# Patient Record
Sex: FEMALE | Race: WHITE | NOT HISPANIC OR LATINO | Employment: FULL TIME | URBAN - METROPOLITAN AREA
[De-identification: names, ages, dates, MRNs, and addresses within clinical notes are randomized per-mention and may not be internally consistent; named-entity substitution may affect disease eponyms.]

---

## 2023-04-13 ENCOUNTER — OFFICE VISIT (OUTPATIENT)
Dept: OBSTETRICS AND GYNECOLOGY | Facility: CLINIC | Age: 23
End: 2023-04-13
Payer: COMMERCIAL

## 2023-04-13 VITALS
DIASTOLIC BLOOD PRESSURE: 82 MMHG | HEIGHT: 71 IN | WEIGHT: 268 LBS | BODY MASS INDEX: 37.52 KG/M2 | SYSTOLIC BLOOD PRESSURE: 134 MMHG

## 2023-04-13 DIAGNOSIS — Z32.00 ENCOUNTER FOR CONFIRMATION OF PREGNANCY TEST RESULT WITH PHYSICAL EXAMINATION: Primary | ICD-10-CM

## 2023-04-13 LAB
B-HCG UR QL: POSITIVE
EXPIRATION DATE: ABNORMAL
INTERNAL NEGATIVE CONTROL: ABNORMAL
INTERNAL POSITIVE CONTROL: ABNORMAL
Lab: ABNORMAL

## 2023-04-13 RX ORDER — DIPHENHYDRAMINE HYDROCHLORIDE 25 MG/1
25 CAPSULE ORAL DAILY
Qty: 30 TABLET | Refills: 1 | Status: SHIPPED | OUTPATIENT
Start: 2023-04-13

## 2023-04-13 RX ORDER — FEXOFENADINE HCL 180 MG/1
180 TABLET ORAL DAILY
COMMUNITY

## 2023-04-13 NOTE — PROGRESS NOTES
"PROBLEM VISIT    Chief Complaint: confirmation of pregnancy      Giuliana Pan is a 22 y.o. patient  presents for confirmation of pregnancy.  Patient has a definite LMP.  She states she was trying for pregnancy.  She is on prenatal vitamins.  Patient is reporting nausea and vomiting.  She is interested in starting medications.  Chief Complaint   Patient presents with   • Amenorrhea             The following portions of the patient's history were reviewed and updated as appropriate: allergies, current medications and problem list.    Review of Systems   Constitutional: Negative for appetite change, chills, fatigue, fever and unexpected weight change.   Gastrointestinal: Positive for nausea and vomiting. Negative for abdominal distention, abdominal pain, anal bleeding, blood in stool, constipation and diarrhea.   Genitourinary: Negative for dyspareunia, dysuria, menstrual problem, pelvic pain, vaginal bleeding, vaginal discharge and vaginal pain.       /82   Ht 180.3 cm (71\")   Wt 122 kg (268 lb)   LMP 2023   BMI 37.38 kg/m²     Physical Exam  Constitutional:       General: She is not in acute distress.     Appearance: Normal appearance. She is not ill-appearing, toxic-appearing or diaphoretic.   Abdominal:      General: There is no distension.      Palpations: Abdomen is soft.      Tenderness: There is no abdominal tenderness. There is no guarding.   Neurological:      General: No focal deficit present.      Mental Status: She is alert and oriented to person, place, and time. Mental status is at baseline.      Motor: No weakness.      Gait: Gait normal.   Psychiatric:         Mood and Affect: Mood normal.         Behavior: Behavior normal.         Thought Content: Thought content normal.         Judgment: Judgment normal.           Assessment & Plan   Diagnoses and all orders for this visit:    1. Encounter for confirmation of pregnancy test result with physical examination (Primary)  -     " Cancel: POC Urinalysis Dipstick  -     POC Pregnancy, Urine    Other orders  -     doxylamine (UNISOM) 25 MG tablet; Take 1 tablet by mouth Every Night.  Dispense: 30 tablet; Refill: 1  -     vitamin B-6 (PYRIDOXINE) 25 MG tablet; Take 1 tablet by mouth Daily.  Dispense: 30 tablet; Refill: 1      22-year-old G1, P0 for confirmation of pregnancy    1) pregnancy: Patient reports a definite LMP and first trimester ultrasound today confirms that she is 9 weeks with NORBERTO 11/14/23. On PNV.  Discussed cell free DNA and genetic carrier screening.  Patient will follow-up in 2 weeks for new OB visit with Pap smear.  US findings: IUP at 8.2 weeks by US. + FCA. Left ovarian cyst measuring 1.95 x 1.77cm    2) N/V of pregnancy: Rx Unisom and vitamins.  Patient to question as well    3) Gyn HM: Due for pap smear             Return in about 2 weeks (around 4/27/2023).      Kimberli Mckinney DO    4/15/2023  15:55 EDT

## 2023-04-27 ENCOUNTER — INITIAL PRENATAL (OUTPATIENT)
Dept: OBSTETRICS AND GYNECOLOGY | Facility: CLINIC | Age: 23
End: 2023-04-27
Payer: COMMERCIAL

## 2023-04-27 VITALS — DIASTOLIC BLOOD PRESSURE: 70 MMHG | BODY MASS INDEX: 37.1 KG/M2 | SYSTOLIC BLOOD PRESSURE: 118 MMHG | WEIGHT: 266 LBS

## 2023-04-27 DIAGNOSIS — Z34.00 INITIAL OBSTETRIC VISIT, ANTEPARTUM: Primary | ICD-10-CM

## 2023-04-27 DIAGNOSIS — Z36.9 ENCOUNTER FOR ANTENATAL SCREENING, UNSPECIFIED: ICD-10-CM

## 2023-04-27 DIAGNOSIS — Z36.9 UNSPECIFIED ANTENATAL SCREENING: ICD-10-CM

## 2023-04-27 DIAGNOSIS — Z01.419 PAP SMEAR, LOW-RISK: ICD-10-CM

## 2023-04-27 DIAGNOSIS — O21.9 NAUSEA AND VOMITING IN PREGNANCY: ICD-10-CM

## 2023-04-27 LAB
GLUCOSE UR STRIP-MCNC: NEGATIVE MG/DL
PROT UR STRIP-MCNC: NEGATIVE MG/DL

## 2023-04-27 PROCEDURE — 0501F PRENATAL FLOW SHEET: CPT | Performed by: NURSE PRACTITIONER

## 2023-04-27 RX ORDER — PRENATAL VIT/IRON FUM/FOLIC AC 27MG-0.8MG
TABLET ORAL DAILY
COMMUNITY

## 2023-04-27 RX ORDER — ONDANSETRON 4 MG/1
4 TABLET, ORALLY DISINTEGRATING ORAL EVERY 8 HOURS PRN
Qty: 20 TABLET | Refills: 1 | Status: SHIPPED | OUTPATIENT
Start: 2023-04-27

## 2023-04-27 NOTE — PROGRESS NOTES
Initial OB Visit     Chief Complaint   Patient presents with   • Initial Prenatal Visit       Giuliana Pan is being seen today for her first obstetrical visit.  She is a 22 y.o.  11w0d gestation. This problem is new to me: yes. Here with significant other.    # 1 - Date: None, Sex: None, Weight: None, GA: None, Delivery: None, Apgar1: None, Apgar5: None, Living: None, Birth Comments: None      LNMP: 23  Confident with date: Yes  Taking prenatal vitamins: Yes  Planned pregnancy: Yes  Prior obstetric issues, potential pregnancy concerns: G1  Family history of genetic issues (includes FOB): no  Prior infections concerning in pregnancy (Rash, fever in last 2 weeks): no  Varicella Hx: unknown  Flu vaccine: no  COVID Vaccine: no   History of STDs: no  Current medications: PNV, unisom/Vit B 6  Last pap smear: 2 yrs ago  Smoker: former  Drug or alcohol abuse: No  H/O Physical, mental or sexual abuse:no  H/O mental health disorder: no  Prior testing for Cystic Fibrosis Carrier or Sickle Cell Trait- no  Prepregnancy BMI: Body mass index is 37.1 kg/m².      History reviewed. No pertinent past medical history.    History reviewed. No pertinent surgical history.      Current Outpatient Medications:   •  Prenatal Vit-Fe Fumarate-FA (prenatal vitamin 27-0.8) 27-0.8 MG tablet tablet, Take  by mouth Daily., Disp: , Rfl:   •  doxylamine (UNISOM) 25 MG tablet, Take 1 tablet by mouth Every Night., Disp: 30 tablet, Rfl: 1  •  fexofenadine (ALLEGRA) 180 MG tablet, Take 1 tablet by mouth Daily. [employer] brand, Disp: , Rfl:   •  ondansetron ODT (ZOFRAN-ODT) 4 MG disintegrating tablet, Place 1 tablet on the tongue Every 8 (Eight) Hours As Needed for Nausea or Vomiting., Disp: 20 tablet, Rfl: 1  •  vitamin B-6 (PYRIDOXINE) 25 MG tablet, Take 1 tablet by mouth Daily., Disp: 30 tablet, Rfl: 1    Allergies   Allergen Reactions   • Sulfa Antibiotics Other (See Comments)       Social History     Socioeconomic History   • Marital status:  Single   Tobacco Use   • Smoking status: Former   • Smokeless tobacco: Former   Vaping Use   • Vaping Use: Former   Substance and Sexual Activity   • Alcohol use: Not Currently   • Drug use: Never   • Sexual activity: Yes     Partners: Male       Family History   Problem Relation Age of Onset   • Diabetes Father    • Diabetes Paternal Grandmother    • Diabetes Maternal Grandmother    • Heart attack Maternal Grandfather    • Diabetes Paternal Uncle    • Breast cancer Neg Hx    • Uterine cancer Neg Hx    • Ovarian cancer Neg Hx    • Colon cancer Neg Hx        Review of systems     All other systems reviewed and are negative except for: Gastrointestinal: positive for nausea and vomiting     Objective    /70   Wt 121 kg (266 lb)   LMP 02/09/2023   BMI 37.10 kg/m²     General Appearance:    Alert, cooperative, in no acute distress, habitus obese   Head:    Normocephalic, without obvious abnormality, atraumatic   Neck:   No adenopathy, supple, trachea midline, no thyromegaly   Lungs:     Clear to auscultation,respirations regular, even and     unlabored    Heart:    Regular rhythm and normal rate, normal S1 and S2, no       murmur, no gallop, no rub, no click   Breast Exam:    No masses, No nipple discharge   Abdomen:     Normal bowel sounds, no masses, no organomegaly, soft,    non-tender, non-distended, no guarding, no rebound                tenderness   Genitalia:    Vulva - BUS-WNL, NEFG    Vagina - No discharge, No bleeding    Cervix - No Lesions, closed     Uterus - Consistent with 11 weeks    Adnexa - No mass, NT    Pelvimetry - clinically adequate, gynecoid pelvis     Extremities:   Moves all extremities well, no edema, no cyanosis, no        redness   Skin:   No bleeding, bruising or rash   Lymph nodes:   No palpable adenopathy   Neurologic:   Sensation intact, A&O times 3         Assessment/Plan    1) Pregnancy at 11w0d- +FCA noted on BS US. EDC established 11/16/2023 and confirmed by LNMP..     2) OB  exam: OB exam completed: Yes. New OB bag provided Yes. Pap collected: Yes. Provided list of safe medications to take while in pregnancy.    3) Labs: OB labs collected: Yes. Counseled on genetic screening/NIPS: Yes, she desires both. Counseled on Quad screen and AFP: Yes, she desires both.    4) Body mass index is 37.1 kg/m². Obese women with a pre-pregnancy BMI of 30+ should strive to gain approx 11-20 pounds for the entire pregnancy.      5)  Prenatal care: Oriented to the office and prenatal care. Encourage prenatal vitamins. Disc Tylenol products are fine, avoid aspirin and ibuprofen; Zika (travel restrictions/ok to use insect repellant); not to change cat litter; food restrictions; exercise; avoidance of alcohol, tobacco, drugs and saunas/hot tubs.     6) nausea/vomiting - ERX Zofran      All questions answered.     I spent 30 minutes caring for Giuliana on this date of service. This time includes time spent by me in the following activities: preparing for the visit, reviewing tests, obtaining and/or reviewing a separately obtained history, performing a medically appropriate examination and/or evaluation, counseling and educating the patient/family/caregiver, ordering medications, tests, or procedures and documenting information in the medical record.      RTO 4 weeks for OBT and AFP    Kathleen Steel, APRCYNTHIA    4/27/2023  13:44 EDT

## 2023-04-28 PROBLEM — Z28.39 MATERNAL VARICELLA, NON-IMMUNE: Status: ACTIVE | Noted: 2023-04-28

## 2023-04-28 PROBLEM — O09.899 MATERNAL VARICELLA, NON-IMMUNE: Status: ACTIVE | Noted: 2023-04-28

## 2023-04-28 LAB
ABO GROUP BLD: ABNORMAL
BASOPHILS # BLD AUTO: 0.1 X10E3/UL (ref 0–0.2)
BASOPHILS NFR BLD AUTO: 1 %
BLD GP AB SCN SERPL QL: NEGATIVE
EOSINOPHIL # BLD AUTO: 0.3 X10E3/UL (ref 0–0.4)
EOSINOPHIL NFR BLD AUTO: 4 %
ERYTHROCYTE [DISTWIDTH] IN BLOOD BY AUTOMATED COUNT: 14 % (ref 11.7–15.4)
HBA1C MFR BLD: 5.5 % (ref 4.8–5.6)
HBV SURFACE AG SERPL QL IA: NEGATIVE
HCT VFR BLD AUTO: 39.2 % (ref 34–46.6)
HCV IGG SERPL QL IA: NON REACTIVE
HGB BLD-MCNC: 12.5 G/DL (ref 11.1–15.9)
HIV 1+2 AB+HIV1 P24 AG SERPL QL IA: NON REACTIVE
IMM GRANULOCYTES # BLD AUTO: 0 X10E3/UL (ref 0–0.1)
IMM GRANULOCYTES NFR BLD AUTO: 0 %
LYMPHOCYTES # BLD AUTO: 1.7 X10E3/UL (ref 0.7–3.1)
LYMPHOCYTES NFR BLD AUTO: 18 %
MCH RBC QN AUTO: 25.8 PG (ref 26.6–33)
MCHC RBC AUTO-ENTMCNC: 31.9 G/DL (ref 31.5–35.7)
MCV RBC AUTO: 81 FL (ref 79–97)
MONOCYTES # BLD AUTO: 0.7 X10E3/UL (ref 0.1–0.9)
MONOCYTES NFR BLD AUTO: 7 %
NEUTROPHILS # BLD AUTO: 7 X10E3/UL (ref 1.4–7)
NEUTROPHILS NFR BLD AUTO: 70 %
PLATELET # BLD AUTO: 358 X10E3/UL (ref 150–450)
RBC # BLD AUTO: 4.84 X10E6/UL (ref 3.77–5.28)
RH BLD: POSITIVE
RPR SER QL: NON REACTIVE
RUBV IGG SERPL IA-ACNC: 2.51 INDEX
VZV IGG SER IA-ACNC: <135 INDEX
WBC # BLD AUTO: 9.8 X10E3/UL (ref 3.4–10.8)

## 2023-04-29 LAB
BACTERIA UR CULT: NORMAL
BACTERIA UR CULT: NORMAL

## 2023-05-02 LAB
CFDNA.FET/CFDNA.TOTAL SFR FETUS: NORMAL %
CITATION REF LAB TEST: NORMAL
FET 13+18+21+X+Y ANEUP PLAS.CFDNA: NEGATIVE
FET CHR 21 TS PLAS.CFDNA QL: NEGATIVE
FET SEX PLAS.CFDNA DOSAGE CFDNA: NORMAL
FET TS 13 RISK PLAS.CFDNA QL: NEGATIVE
FET TS 18 RISK WBC.DNA+CFDNA QL: NEGATIVE
GA EST FROM CONCEPTION DATE: NORMAL D
GESTATIONAL AGE > 9:: YES
LAB DIRECTOR NAME PROVIDER: NORMAL
LAB DIRECTOR NAME PROVIDER: NORMAL
LABORATORY COMMENT REPORT: NORMAL
LIMITATIONS OF THE TEST: NORMAL
NEGATIVE PREDICTIVE VALUE: NORMAL
NOTE: NORMAL
PERFORMANCE CHARACTERISTICS: NORMAL
POSITIVE PREDICTIVE VALUE: NORMAL
REF LAB TEST METHOD: NORMAL
TEST PERFORMANCE INFO SPEC: NORMAL

## 2023-05-05 LAB
C TRACH RRNA CVX QL NAA+PROBE: NEGATIVE
CONV .: NORMAL
CYTOLOGIST CVX/VAG CYTO: NORMAL
CYTOLOGY CVX/VAG DOC CYTO: NORMAL
CYTOLOGY CVX/VAG DOC THIN PREP: NORMAL
DX ICD CODE: NORMAL
HIV 1 & 2 AB SER-IMP: NORMAL
N GONORRHOEA RRNA CVX QL NAA+PROBE: NEGATIVE
OTHER STN SPEC: NORMAL
STAT OF ADQ CVX/VAG CYTO-IMP: NORMAL
T VAGINALIS RRNA SPEC QL NAA+PROBE: NEGATIVE

## 2023-05-25 ENCOUNTER — ROUTINE PRENATAL (OUTPATIENT)
Dept: OBSTETRICS AND GYNECOLOGY | Facility: CLINIC | Age: 23
End: 2023-05-25
Payer: COMMERCIAL

## 2023-05-25 VITALS — WEIGHT: 255.6 LBS | DIASTOLIC BLOOD PRESSURE: 86 MMHG | BODY MASS INDEX: 35.65 KG/M2 | SYSTOLIC BLOOD PRESSURE: 122 MMHG

## 2023-05-25 DIAGNOSIS — Z34.92 PRENATAL CARE IN SECOND TRIMESTER: Primary | ICD-10-CM

## 2023-05-25 DIAGNOSIS — Z36.9 ENCOUNTER FOR ANTENATAL SCREENING, UNSPECIFIED: ICD-10-CM

## 2023-05-25 LAB
GLUCOSE UR STRIP-MCNC: NEGATIVE MG/DL
PROT UR STRIP-MCNC: NEGATIVE MG/DL

## 2023-05-25 PROCEDURE — 0502F SUBSEQUENT PRENATAL CARE: CPT | Performed by: NURSE PRACTITIONER

## 2023-05-25 NOTE — PROGRESS NOTES
OB follow up     CC:  Here for prenatal follow up    Giuliana Pan is a 22 y.o.  15w0d being seen today for her obstetrical visit.  Patient reports  improved but continued nausea.   Taking +PNV.     Review of Systems  GI: + nausea   Genitourinary: Neg for cramping, vaginal bleeding, SROM, or dysuria.       /86   Wt 116 kg (255 lb 9.6 oz)   LMP 2023   BMI 35.65 kg/m²     FHT: 150 BPM   Uterine Size: size equals dates   Assessment    1) Pregnancy at 15w0d- T21 neg, male. Declines CF, SMA, FX and AFP today.     2) Varicella non immune- Offer varivax postpartum     3) N/V- Has lost 11# since her last visit. She is eating and not skipping meals. She reports she has vomited 2x in the last week and a half.  Has B6, unisom and zofran at home. Reports improved but continues to occur.     4) Increased BMI- BMI 35. Will need growth US 28, 32,@ 36 weeks.     5) Declines flu and covid vaccine       Plan    Continue prenatal vitamins   Reviewed this stage of pregnancy  Problem list updated   Follow up in 4 weeks for OB tummy and anatomy US     Yuni You, APRN  2023  11:46 EDT

## 2023-06-05 PROBLEM — Z34.92 PRENATAL CARE IN SECOND TRIMESTER: Status: ACTIVE | Noted: 2023-06-05

## 2023-08-03 ENCOUNTER — ROUTINE PRENATAL (OUTPATIENT)
Dept: OBSTETRICS AND GYNECOLOGY | Facility: CLINIC | Age: 23
End: 2023-08-03
Payer: COMMERCIAL

## 2023-08-03 VITALS — BODY MASS INDEX: 34.67 KG/M2 | DIASTOLIC BLOOD PRESSURE: 88 MMHG | SYSTOLIC BLOOD PRESSURE: 136 MMHG | WEIGHT: 248.6 LBS

## 2023-08-03 DIAGNOSIS — Z36.9 ENCOUNTER FOR ANTENATAL SCREENING, UNSPECIFIED: Primary | ICD-10-CM

## 2023-08-03 LAB
BILIRUB BLD-MCNC: NEGATIVE MG/DL
CLARITY, POC: CLEAR
COLOR UR: YELLOW
GLUCOSE UR STRIP-MCNC: NEGATIVE MG/DL
KETONES UR QL: NEGATIVE
LEUKOCYTE EST, POC: NEGATIVE
NITRITE UR-MCNC: NEGATIVE MG/ML
PH UR: 5 [PH] (ref 5–8)
PROT UR STRIP-MCNC: NEGATIVE MG/DL
RBC # UR STRIP: NEGATIVE /UL
SP GR UR: 1 (ref 1–1.03)
UROBILINOGEN UR QL: NORMAL

## 2023-08-24 ENCOUNTER — ROUTINE PRENATAL (OUTPATIENT)
Dept: OBSTETRICS AND GYNECOLOGY | Facility: CLINIC | Age: 23
End: 2023-08-24
Payer: COMMERCIAL

## 2023-08-24 VITALS — BODY MASS INDEX: 30.54 KG/M2 | DIASTOLIC BLOOD PRESSURE: 88 MMHG | WEIGHT: 219 LBS | SYSTOLIC BLOOD PRESSURE: 128 MMHG

## 2023-08-24 DIAGNOSIS — E66.9 OBESITY (BMI 30-39.9): ICD-10-CM

## 2023-08-24 DIAGNOSIS — Z28.39 MATERNAL VARICELLA, NON-IMMUNE: ICD-10-CM

## 2023-08-24 DIAGNOSIS — Z36.9 UNSPECIFIED ANTENATAL SCREENING: ICD-10-CM

## 2023-08-24 DIAGNOSIS — O09.899 MATERNAL VARICELLA, NON-IMMUNE: ICD-10-CM

## 2023-08-24 DIAGNOSIS — Z23 NEED FOR TDAP VACCINATION: ICD-10-CM

## 2023-08-24 DIAGNOSIS — Z3A.28 28 WEEKS GESTATION OF PREGNANCY: Primary | ICD-10-CM

## 2023-08-24 PROBLEM — Z34.93 PRENATAL CARE IN THIRD TRIMESTER: Status: ACTIVE | Noted: 2023-06-05

## 2023-08-24 LAB
2ND TRIMESTER 4 SCREEN SERPL-IMP: NORMAL
BILIRUB BLD-MCNC: NEGATIVE MG/DL
CLARITY, POC: CLEAR
COLOR UR: YELLOW
EXTERNAL CYSTIC FIBROSIS: NORMAL
EXTERNAL NIPT: NORMAL
GLUCOSE UR STRIP-MCNC: NEGATIVE MG/DL
KETONES UR QL: NEGATIVE
LEUKOCYTE EST, POC: NEGATIVE
NITRITE UR-MCNC: NEGATIVE MG/ML
PH UR: 5 [PH] (ref 5–8)
PROT UR STRIP-MCNC: NEGATIVE MG/DL
RBC # UR STRIP: NEGATIVE /UL
SP GR UR: 1 (ref 1–1.03)
UROBILINOGEN UR QL: NORMAL

## 2023-08-24 NOTE — PROGRESS NOTES
OB follow up     Giuliana Pan is a 22 y.o.  28w0d being seen today for her obstetrical visit.  Patient reports no complaints. Fetal movement: normal. She is agreeable to Tdap vaccine and we discussed getting a flu shot when available. This is the first time I am seeing this patient in this pregnancy and all of her problems are new to me, the examiner.       Review of Systems  No bleeding, No cramping/contractions     /88   Wt 99.3 kg (219 lb)   LMP 2023   BMI 30.54 kg/mý     FHT: 127 BPM   Uterine Size: 30  cm       Assessment/Plan:    1) 22 y.o.  -pregnancy at 28w0d- 2 hour GTT in progress. RH +    2) Patient advised to have the Tdap shot in the later part of pregnancy to help protect against whooping cough.  Also advised that FOB and other adults who come in contact with the infant should also be vaccinated with Tdap.  Vaccine given today    3) BMI 30- HgBA1c= 5.5. US growth today shows growth 59% ( 2.12#), JAMIE= 20.1 cm.. FHR= 127, transverse fetus, posterior placetna. US compared to last scan on 2023. Repeat US growth 32 and 36 weeks    4) Enc flu shot when available    5) Check UDS ( not done previously)    6) VNI- avoid people with fevers and rashes. Varivax pp    7) Declines CF/SMA/FX/ECS    8) NIPT low risk, missed AFP    9)Reviewed this stage of pregnancy    10)Problem list updated     11) RTO 2 weeks OBT      Brenda Hopper MD    2023  12:27 EDT

## 2023-08-25 LAB
GLUCOSE 1H P 75 G GLC PO SERPL-MCNC: 142 MG/DL (ref 65–179)
GLUCOSE 2H P 75 G GLC PO SERPL-MCNC: 111 MG/DL (ref 65–154)
GLUCOSE P FAST SERPL-MCNC: 32 MG/DL (ref 65–94)
HCT VFR BLD AUTO: 37 % (ref 34–46.6)
HGB BLD-MCNC: 12.2 G/DL (ref 12–15.9)

## 2023-09-08 ENCOUNTER — ROUTINE PRENATAL (OUTPATIENT)
Dept: OBSTETRICS AND GYNECOLOGY | Facility: CLINIC | Age: 23
End: 2023-09-08
Payer: COMMERCIAL

## 2023-09-08 VITALS — BODY MASS INDEX: 34.7 KG/M2 | WEIGHT: 248.8 LBS | DIASTOLIC BLOOD PRESSURE: 86 MMHG | SYSTOLIC BLOOD PRESSURE: 126 MMHG

## 2023-09-08 DIAGNOSIS — E66.9 OBESITY (BMI 30-39.9): ICD-10-CM

## 2023-09-08 DIAGNOSIS — Z34.93 PRENATAL CARE IN THIRD TRIMESTER: Primary | ICD-10-CM

## 2023-09-08 DIAGNOSIS — Z28.39 MATERNAL VARICELLA, NON-IMMUNE: ICD-10-CM

## 2023-09-08 DIAGNOSIS — O09.899 MATERNAL VARICELLA, NON-IMMUNE: ICD-10-CM

## 2023-09-08 NOTE — PROGRESS NOTES
OB follow up     Giuliana Pan is a 22 y.o.  30w1d being seen today for her obstetrical visit.  Patient reports no complaints. Fetal movement: normal.  Here with significant other.       Review of Systems  No bleeding, No cramping/contractions     /86   Wt 113 kg (248 lb 12.8 oz)   LMP 2023   BMI 34.70 kg/m²     FHT: 130 BPM   Uterine Size: 30  cm       Assessment/Plan:    1) 22 y.o.  -pregnancy at 30w1d- passed 2 hour GTT    2) s/p Tdap vaccine    3) BMI 30- HgBA1c= 5.5. US growth today shows growth 59% ( 2.12#), JAMIE= 20.1 cm.. FHR= 127, transverse fetus, posterior placetna. US compared to last scan on 2023. Repeat US growth 32 and 36 weeks    4) Enc flu shot when available    5) Check UDS ( not done previously)- didn't leave enough urine today    6) VNI- avoid people with fevers and rashes. Varivax pp    7) Declines CF/SMA/FX/ECS    8) NIPT low risk, missed AFP      Reviewed this stage of pregnancy  Problem list updated   RTO 2 weeks OBT and US for growth    Kathleen Steel, LOUIE    2023  12:03 EDT

## 2023-09-18 DIAGNOSIS — O21.9 NAUSEA AND VOMITING IN PREGNANCY: ICD-10-CM

## 2023-09-20 RX ORDER — ONDANSETRON 4 MG/1
4 TABLET, ORALLY DISINTEGRATING ORAL EVERY 8 HOURS PRN
Qty: 20 TABLET | Refills: 1 | Status: SHIPPED | OUTPATIENT
Start: 2023-09-20

## 2023-09-27 ENCOUNTER — ROUTINE PRENATAL (OUTPATIENT)
Dept: OBSTETRICS AND GYNECOLOGY | Facility: CLINIC | Age: 23
End: 2023-09-27
Payer: COMMERCIAL

## 2023-09-27 VITALS — SYSTOLIC BLOOD PRESSURE: 122 MMHG | DIASTOLIC BLOOD PRESSURE: 72 MMHG | BODY MASS INDEX: 35.15 KG/M2 | WEIGHT: 252 LBS

## 2023-09-27 DIAGNOSIS — Z34.93 PRENATAL CARE IN THIRD TRIMESTER: Primary | ICD-10-CM

## 2023-09-27 DIAGNOSIS — Z36.9 ENCOUNTER FOR ANTENATAL SCREENING, UNSPECIFIED: ICD-10-CM

## 2023-09-27 DIAGNOSIS — Z28.39 MATERNAL VARICELLA, NON-IMMUNE: ICD-10-CM

## 2023-09-27 DIAGNOSIS — O21.9 NAUSEA AND VOMITING IN PREGNANCY: ICD-10-CM

## 2023-09-27 DIAGNOSIS — O09.899 MATERNAL VARICELLA, NON-IMMUNE: ICD-10-CM

## 2023-09-27 RX ORDER — PROMETHAZINE HYDROCHLORIDE 12.5 MG/1
12.5 TABLET ORAL EVERY 6 HOURS PRN
Qty: 30 TABLET | Refills: 1 | Status: SHIPPED | OUTPATIENT
Start: 2023-09-27

## 2023-09-27 NOTE — PROGRESS NOTES
OB follow up     Giuliana Pan is a 23 y.o.  30w1d being seen today for her obstetrical visit.  Patient reports no complaints. Fetal movement: normal.  Here with significant other.       Review of Systems  No bleeding, No cramping/contractions     /72   Wt 114 kg (252 lb)   LMP 2023   BMI 35.15 kg/m²     FHT: 130 BPM   Uterine Size: 30  cm       Assessment/Plan:    1) 23 y.o.  -pregnancy at 30w1d- passed 2 hour GTT    2) s/p Tdap vaccine    3) BMI 30- HgBA1c= 5.5. US growth today shows growth 59% ( 2.12#), JAMIE= 20.1 cm.. FHR= 127, transverse fetus, posterior placetna. US compared to last scan on 2023. Repeat US growth 32 and 36 weeks    4) Enc flu shot when available    5) Check UDS ( not done previously)- didn't leave enough urine today    6) VNI- avoid people with fevers and rashes. Varivax pp    7) Declines CF/SMA/FX/ECS    8) NIPT low risk, missed AFP      Reviewed this stage of pregnancy  Problem list updated   RTO 2 weeks OBT and US for growth    Kathleen Steel, LOUIE    2023  10:54 EDT

## 2023-09-27 NOTE — PROGRESS NOTES
OB follow up     Giuliana Pan is a 23 y.o.  32w6d being seen today for her obstetrical visit.  Patient reports no complaints. Fetal movement: normal.  Here with significant other.       Review of Systems  No bleeding, No cramping/contractions, no SROM    /72   Wt 114 kg (252 lb)   LMP 2023   BMI 35.15 kg/m²     FHT: 137 BPM   Uterine Size: EFW 41%       Assessment/Plan:    1) 23 y.o.  -pregnancy at 32w6d-     2) s/p Tdap vaccine    3) BMI 30- HgBA1c= 5.5. US growth @ 28wga- 59% (2.12#). Repeat US growth 32 (41%) and 36 weeks ()    4) Enc flu shot when available    5) Check UDS (not done previously)- UDS done today    6) VNI- avoid people with fevers and rashes. Varivax pp    7) Declines CF/SMA/FX/ECS    8) NIPT low risk, missed AFP    9) N/V- Zofran, Unisom/vit. B6 not helping; ERX phenergan       Reviewed this stage of pregnancy  Problem list updated   RTO 2 weeks OBT    Kathleen Steel, APRCYNTHIA    2023  18:30 EDT

## 2023-09-28 LAB
AMPHETAMINES UR QL SCN: NEGATIVE NG/ML
BARBITURATES UR QL SCN: NEGATIVE NG/ML
BENZODIAZ UR QL SCN: NEGATIVE NG/ML
BUPRENORPHINE UR QL: NEGATIVE NG/ML
BZE UR QL SCN: NEGATIVE NG/ML
CANNABINOIDS UR QL SCN: NEGATIVE NG/ML
CREAT UR-MCNC: 76.2 MG/DL (ref 20–300)
FENTANYL UR-MCNC: NEGATIVE PG/ML
LABORATORY COMMENT REPORT: NORMAL
MEPERIDINE UR QL: NEGATIVE NG/ML
METHADONE UR QL SCN: NEGATIVE NG/ML
OPIATES UR QL SCN: NEGATIVE NG/ML
OXYCODONE+OXYMORPHONE UR QL SCN: NEGATIVE NG/ML
PCP UR QL: NEGATIVE NG/ML
PH UR: 7.1 [PH] (ref 4.5–8.9)
PROPOXYPH UR QL SCN: NEGATIVE NG/ML
TRAMADOL UR QL SCN: NEGATIVE NG/ML

## 2023-10-04 ENCOUNTER — TELEPHONE (OUTPATIENT)
Dept: OBSTETRICS AND GYNECOLOGY | Facility: CLINIC | Age: 23
End: 2023-10-04
Payer: COMMERCIAL

## 2023-10-04 NOTE — TELEPHONE ENCOUNTER
Caller: Giuliana Pan    Relationship to patient: Self    Best call back number: [phone] (home)  CAN CALL BACK AND LVM    PT INQUIRING ABOUT FMLA.  IT WAS TURNED IN ON 10-.  THANK YOU.

## 2023-10-11 ENCOUNTER — ROUTINE PRENATAL (OUTPATIENT)
Dept: OBSTETRICS AND GYNECOLOGY | Facility: CLINIC | Age: 23
End: 2023-10-11
Payer: COMMERCIAL

## 2023-10-11 VITALS — BODY MASS INDEX: 35.43 KG/M2 | DIASTOLIC BLOOD PRESSURE: 82 MMHG | SYSTOLIC BLOOD PRESSURE: 124 MMHG | WEIGHT: 254 LBS

## 2023-10-11 DIAGNOSIS — Z36.9 ENCOUNTER FOR ANTENATAL SCREENING, UNSPECIFIED: ICD-10-CM

## 2023-10-11 DIAGNOSIS — Z34.93 PRENATAL CARE IN THIRD TRIMESTER: Primary | ICD-10-CM

## 2023-10-11 NOTE — PROGRESS NOTES
OB follow up     Giuliana Pan is a 23 y.o.  34w6d being seen today for her obstetrical visit.  Patient reports no complaints. Fetal movement: normal.  Here with significant other.       Review of Systems  No bleeding, No cramping/contractions, no SROM    /82   Wt 115 kg (254 lb)   LMP 2023   BMI 35.43 kg/mý     FHT: 160s  BPM   Uterine Size: S>D        Assessment/Plan:    1) 23 y.o.  -pregnancy at 34w6d-     2) s/p Tdap vaccine    3) BMI 30- HgBA1c= 5.5.  passed 2hr GTT. US growth @ 28wga- 59% (2.12#). Repeat US growth 32 (41%) and 36 weeks ()    4)  VNI- avoid people with fevers and rashes. Varivax pp    5) Declines CF/SMA/FX/ECS    6) NIPT low risk, missed AFP    7) N/V- Zofran, Unisom/vit. B6 not helping; has phenergan rx but has not picked up from pharmacy.     8) Flu vaccine- Encouraged the flu vaccine during pregnancy. Discuss normal physiological changes during pregnancy increase the susceptibility of the flu virus and increase the risk of severe illness for the pregnant woman. Disc flu can be harmful to the unborn baby as well. Enc the flu vaccine. Disc with patient that getting the flu vaccine is the first and most important step in protecting against the flu. Flu vaccines given during pregnancy help protect both the mother and the baby. Getting the flu vaccine during pregnancy also helps protect the  from flu illness for several months after their birth, when then are too young to get vaccinated. Also disc the importance of good hand hygiene and avoiding people who are sick. The patient declines the flu vaccine.     9) Peds- [facility]. Male infant- Undecided on circ. Breast. Considering epidrual.       Reviewed this stage of pregnancy  Problem list updated   RTO 1 weeks OBT and US -growth     Yuni You, LOUIE    10/11/2023  11:02 EDT

## 2023-10-17 ENCOUNTER — ROUTINE PRENATAL (OUTPATIENT)
Dept: OBSTETRICS AND GYNECOLOGY | Facility: CLINIC | Age: 23
End: 2023-10-17
Payer: COMMERCIAL

## 2023-10-17 VITALS — WEIGHT: 256 LBS | SYSTOLIC BLOOD PRESSURE: 134 MMHG | BODY MASS INDEX: 35.7 KG/M2 | DIASTOLIC BLOOD PRESSURE: 78 MMHG

## 2023-10-17 DIAGNOSIS — Z36.85 ENCOUNTER FOR ANTENATAL SCREENING FOR STREPTOCOCCUS B: Primary | ICD-10-CM

## 2023-10-17 DIAGNOSIS — Z36.9 ENCOUNTER FOR ANTENATAL SCREENING, UNSPECIFIED: ICD-10-CM

## 2023-10-17 DIAGNOSIS — O09.899 MATERNAL VARICELLA, NON-IMMUNE: ICD-10-CM

## 2023-10-17 DIAGNOSIS — Z34.93 PRENATAL CARE IN THIRD TRIMESTER: ICD-10-CM

## 2023-10-17 DIAGNOSIS — Z28.39 MATERNAL VARICELLA, NON-IMMUNE: ICD-10-CM

## 2023-10-17 NOTE — PROGRESS NOTES
OB follow up     Giuliana Pan is a 23 y.o.  35+ being seen today for her obstetrical visit.  Patient reports no complaints. Fetal movement: normal.  Here with significant other.       Review of Systems  No bleeding, No cramping/contractions, no SROM    /78   Wt 116 kg (256 lb)   LMP 2023   BMI 35.70 kg/m²     Vitals: VSS; AF    General Appearance:  Awake. Alert. Well developed. Well nourished. In no acute distress.    Visual Inspection: ° Abdomen was normal on visual inspection.  Palpation: ° Abdomen was soft. ° Abdominal non-tender.    Uterus: ° Fundal height was normal for gestational age. ° Not tender.  Uterine Adnexae: ° Normal without masses or tenderness.  Neurological:  ° Oriented to time, place, and person.  Skin:  ° General appearance was normal. No bruising or ecchymosis.  Obstetrical: +FM and FCA     Presentation: Breech  Placenta: Posterior  JAMEI: 13cm  FCA: 120's    EFW  2670g 5.14# 41%         Ultrasound images and report reviewed personally by me.    Full interpretation of ultrasound can be found in the patient's note on the same date of service.     Froylan Haque, DO        Assessment/Plan:    1) 23 y.o.  -pregnancy at 35+  GBS obtained  Breech today      2) s/p Tdap vaccine    3) BMI 30- HgBA1c= 5.5.  passed 2hr GTT. US growth @ 28wga- 59% (2.12#). Repeat US growth 32 (41%) and 36 weeks (41%)    4)  VNI- avoid people with fevers and rashes. Varivax pp    5) Declines CF/SMA/FX/ECS    6) NIPT low risk, missed AFP    7) N/V- Zofran, Unisom/vit. B6 not helping; has phenergan rx but has not picked up from pharmacy.     8) Flu vaccine- Encouraged the flu vaccine during pregnancy. Discuss normal physiological changes during pregnancy increase the susceptibility of the flu virus and increase the risk of severe illness for the pregnant woman. Disc flu can be harmful to the unborn baby as well. Enc the flu vaccine. Disc with patient that getting the flu vaccine is the first and most  important step in protecting against the flu. Flu vaccines given during pregnancy help protect both the mother and the baby. Getting the flu vaccine during pregnancy also helps protect the  from flu illness for several months after their birth, when then are too young to get vaccinated. Also disc the importance of good hand hygiene and avoiding people who are sick. The patient declines the flu vaccine.     9) Peds- [facility]. Male infant- Undecided on circ. Breast. Considering epidrual.     10) Breech presentation  US next week for presentation  We discussed PLTCS for persistent breech presentation today       Reviewed this stage of pregnancy  Problem list updated   RTO 1 weeks Ob, US     Froylan Haque DO    10/17/2023  11:58 EDT

## 2023-10-20 LAB — B-HEM STREP SPEC QL CULT: POSITIVE

## 2023-10-24 ENCOUNTER — HOSPITAL ENCOUNTER (OUTPATIENT)
Facility: HOSPITAL | Age: 23
Discharge: HOME | End: 2023-10-24
Attending: OBSTETRICS & GYNECOLOGY | Admitting: OBSTETRICS & GYNECOLOGY
Payer: COMMERCIAL

## 2023-10-24 ENCOUNTER — ROUTINE PRENATAL (OUTPATIENT)
Dept: OBSTETRICS AND GYNECOLOGY | Facility: CLINIC | Age: 23
End: 2023-10-24
Payer: COMMERCIAL

## 2023-10-24 VITALS
HEART RATE: 75 BPM | DIASTOLIC BLOOD PRESSURE: 73 MMHG | WEIGHT: 254 LBS | SYSTOLIC BLOOD PRESSURE: 130 MMHG | RESPIRATION RATE: 18 BRPM | TEMPERATURE: 98 F | HEIGHT: 71 IN | BODY MASS INDEX: 35.56 KG/M2

## 2023-10-24 VITALS
WEIGHT: 254.8 LBS | BODY MASS INDEX: 35.54 KG/M2 | SYSTOLIC BLOOD PRESSURE: 164 MMHG | DIASTOLIC BLOOD PRESSURE: 100 MMHG

## 2023-10-24 DIAGNOSIS — E66.9 OBESITY (BMI 30-39.9): ICD-10-CM

## 2023-10-24 DIAGNOSIS — Z36.9 ENCOUNTER FOR ANTENATAL SCREENING, UNSPECIFIED: Primary | ICD-10-CM

## 2023-10-24 DIAGNOSIS — Z34.93 PRENATAL CARE IN THIRD TRIMESTER: ICD-10-CM

## 2023-10-24 PROBLEM — Z34.90 PREGNANCY: Status: ACTIVE | Noted: 2023-10-24

## 2023-10-24 LAB
ALBUMIN SERPL-MCNC: 3.6 G/DL (ref 3.5–5.2)
ALBUMIN/GLOB SERPL: 1 G/DL
ALP SERPL-CCNC: 91 U/L (ref 39–117)
ALT SERPL W P-5'-P-CCNC: 10 U/L (ref 1–33)
AMPHET+METHAMPHET UR QL: NEGATIVE
AMPHETAMINES UR QL: NEGATIVE
ANION GAP SERPL CALCULATED.3IONS-SCNC: 9.7 MMOL/L (ref 5–15)
AST SERPL-CCNC: 16 U/L (ref 1–32)
BACTERIA UR QL AUTO: ABNORMAL /HPF
BARBITURATES UR QL SCN: NEGATIVE
BENZODIAZ UR QL SCN: NEGATIVE
BILIRUB BLD-MCNC: NEGATIVE MG/DL
BILIRUB SERPL-MCNC: 0.3 MG/DL (ref 0–1.2)
BILIRUB UR QL STRIP: NEGATIVE
BUN SERPL-MCNC: 7 MG/DL (ref 6–20)
BUN/CREAT SERPL: 11.1 (ref 7–25)
BUPRENORPHINE SERPL-MCNC: NEGATIVE NG/ML
CALCIUM SPEC-SCNC: 9.5 MG/DL (ref 8.6–10.5)
CANNABINOIDS SERPL QL: NEGATIVE
CHLORIDE SERPL-SCNC: 102 MMOL/L (ref 98–107)
CLARITY UR: ABNORMAL
CLARITY, POC: CLEAR
CO2 SERPL-SCNC: 24.3 MMOL/L (ref 22–29)
COCAINE UR QL: NEGATIVE
COLOR UR: YELLOW
COLOR UR: YELLOW
CREAT SERPL-MCNC: 0.63 MG/DL (ref 0.57–1)
CREAT UR-MCNC: 194.4 MG/DL
DEPRECATED RDW RBC AUTO: 48 FL (ref 37–54)
EGFRCR SERPLBLD CKD-EPI 2021: 126.4 ML/MIN/1.73
ERYTHROCYTE [DISTWIDTH] IN BLOOD BY AUTOMATED COUNT: 15.2 % (ref 12.3–15.4)
GLOBULIN UR ELPH-MCNC: 3.6 GM/DL
GLUCOSE SERPL-MCNC: 84 MG/DL (ref 65–99)
GLUCOSE UR STRIP-MCNC: NEGATIVE MG/DL
GLUCOSE UR STRIP-MCNC: NEGATIVE MG/DL
HCT VFR BLD AUTO: 37.3 % (ref 34–46.6)
HGB BLD-MCNC: 11.5 G/DL (ref 12–15.9)
HGB UR QL STRIP.AUTO: ABNORMAL
HOLD SPECIMEN: NORMAL
HYALINE CASTS UR QL AUTO: ABNORMAL /LPF
KETONES UR QL STRIP: NEGATIVE
KETONES UR QL: NEGATIVE
LEUKOCYTE EST, POC: NEGATIVE
LEUKOCYTE ESTERASE UR QL STRIP.AUTO: ABNORMAL
MCH RBC QN AUTO: 26.7 PG (ref 26.6–33)
MCHC RBC AUTO-ENTMCNC: 30.8 G/DL (ref 31.5–35.7)
MCV RBC AUTO: 86.5 FL (ref 79–97)
METHADONE UR QL SCN: NEGATIVE
NITRITE UR QL STRIP: NEGATIVE
NITRITE UR-MCNC: NEGATIVE MG/ML
OPIATES UR QL: NEGATIVE
OXYCODONE UR QL SCN: NEGATIVE
PCP UR QL SCN: NEGATIVE
PH UR STRIP.AUTO: 6.5 [PH] (ref 4.5–8)
PH UR: 5 [PH] (ref 5–8)
PLATELET # BLD AUTO: 321 10*3/MM3 (ref 140–450)
PMV BLD AUTO: 10.7 FL (ref 6–12)
POTASSIUM SERPL-SCNC: 4.2 MMOL/L (ref 3.5–5.2)
PROPOXYPH UR QL: NEGATIVE
PROT ?TM UR-MCNC: 75 MG/DL
PROT SERPL-MCNC: 7.2 G/DL (ref 6–8.5)
PROT UR QL STRIP: ABNORMAL
PROT UR STRIP-MCNC: NEGATIVE MG/DL
PROT/CREAT UR: 385.8 MG/G CREA (ref 0–200)
RBC # BLD AUTO: 4.31 10*6/MM3 (ref 3.77–5.28)
RBC # UR STRIP: ABNORMAL /HPF
RBC # UR STRIP: NEGATIVE /UL
REF LAB TEST METHOD: ABNORMAL
SODIUM SERPL-SCNC: 136 MMOL/L (ref 136–145)
SP GR UR STRIP: 1.02 (ref 1–1.03)
SP GR UR: 1 (ref 1–1.03)
SQUAMOUS #/AREA URNS HPF: ABNORMAL /HPF
TRICYCLICS UR QL SCN: NEGATIVE
URATE SERPL-MCNC: 6.6 MG/DL (ref 2.4–5.7)
UROBILINOGEN UR QL STRIP: ABNORMAL
UROBILINOGEN UR QL: NORMAL
WBC # UR STRIP: ABNORMAL /HPF
WBC NRBC COR # BLD: 10.32 10*3/MM3 (ref 3.4–10.8)

## 2023-10-24 PROCEDURE — 80053 COMPREHEN METABOLIC PANEL: CPT | Performed by: OBSTETRICS & GYNECOLOGY

## 2023-10-24 PROCEDURE — 85027 COMPLETE CBC AUTOMATED: CPT | Performed by: OBSTETRICS & GYNECOLOGY

## 2023-10-24 PROCEDURE — 84550 ASSAY OF BLOOD/URIC ACID: CPT | Performed by: OBSTETRICS & GYNECOLOGY

## 2023-10-24 PROCEDURE — 84156 ASSAY OF PROTEIN URINE: CPT | Performed by: OBSTETRICS & GYNECOLOGY

## 2023-10-24 PROCEDURE — 82570 ASSAY OF URINE CREATININE: CPT | Performed by: OBSTETRICS & GYNECOLOGY

## 2023-10-24 PROCEDURE — 81001 URINALYSIS AUTO W/SCOPE: CPT | Performed by: OBSTETRICS & GYNECOLOGY

## 2023-10-24 PROCEDURE — 0502F SUBSEQUENT PRENATAL CARE: CPT | Performed by: OBSTETRICS & GYNECOLOGY

## 2023-10-24 PROCEDURE — 80306 DRUG TEST PRSMV INSTRMNT: CPT | Performed by: OBSTETRICS & GYNECOLOGY

## 2023-10-24 PROCEDURE — 25010000002 BETAMETHASONE ACET & SOD PHOS PER 4 MG: Performed by: OBSTETRICS & GYNECOLOGY

## 2023-10-24 PROCEDURE — 96372 THER/PROPH/DIAG INJ SC/IM: CPT

## 2023-10-24 PROCEDURE — G0463 HOSPITAL OUTPT CLINIC VISIT: HCPCS

## 2023-10-24 PROCEDURE — 59025 FETAL NON-STRESS TEST: CPT

## 2023-10-24 RX ORDER — BETAMETHASONE SODIUM PHOSPHATE AND BETAMETHASONE ACETATE 3; 3 MG/ML; MG/ML
12 INJECTION, SUSPENSION INTRA-ARTICULAR; INTRALESIONAL; INTRAMUSCULAR; SOFT TISSUE ONCE
Status: COMPLETED | OUTPATIENT
Start: 2023-10-24 | End: 2023-10-24

## 2023-10-24 RX ORDER — SODIUM CHLORIDE, SODIUM LACTATE, POTASSIUM CHLORIDE, CALCIUM CHLORIDE 600; 310; 30; 20 MG/100ML; MG/100ML; MG/100ML; MG/100ML
125 INJECTION, SOLUTION INTRAVENOUS CONTINUOUS
Status: DISCONTINUED | OUTPATIENT
Start: 2023-10-24 | End: 2023-10-24 | Stop reason: HOSPADM

## 2023-10-24 RX ORDER — SODIUM CHLORIDE 0.9 % (FLUSH) 0.9 %
10 SYRINGE (ML) INJECTION EVERY 12 HOURS SCHEDULED
Status: DISCONTINUED | OUTPATIENT
Start: 2023-10-24 | End: 2023-10-24 | Stop reason: HOSPADM

## 2023-10-24 RX ORDER — SODIUM CHLORIDE 0.9 % (FLUSH) 0.9 %
10 SYRINGE (ML) INJECTION AS NEEDED
Status: DISCONTINUED | OUTPATIENT
Start: 2023-10-24 | End: 2023-10-24 | Stop reason: HOSPADM

## 2023-10-24 RX ORDER — SODIUM CHLORIDE 9 MG/ML
40 INJECTION, SOLUTION INTRAVENOUS AS NEEDED
Status: DISCONTINUED | OUTPATIENT
Start: 2023-10-24 | End: 2023-10-24 | Stop reason: HOSPADM

## 2023-10-24 RX ADMIN — BETAMETHASONE SODIUM PHOSPHATE AND BETAMETHASONE ACETATE 12 MG: 3; 3 INJECTION, SUSPENSION INTRA-ARTICULAR; INTRALESIONAL; INTRAMUSCULAR at 14:30

## 2023-10-24 NOTE — NON STRESS TEST
Giuliana Pan, a  at 36w5d with an NORBERTO of 2023, by Last Menstrual Period, was seen at [facility] for a nonstress test.    Chief Complaint   Patient presents with    Elevated Blood Pressure     In MD office       Patient Active Problem List   Diagnosis    Maternal varicella, non-immune    Prenatal care in third trimester    Obesity (BMI 30-39.9)    Need for Tdap vaccination    Maternal care for breech presentation, single gestation    Pregnancy       Start Time: 1400  Stop Time: 1456    Interpretation A  Nonstress Test Interpretation A: Reactive

## 2023-10-24 NOTE — DISCHARGE INSTR - APPOINTMENTS
You are to return to the hospital on 10/25/2023 at 2pm for monitoring of the baby, Betamethazone injection and serial blood pressures.    Also make an appointment for yourself for 10/26/2023 to see the doctor in the office.

## 2023-10-24 NOTE — PROGRESS NOTES
Pt here for ob internal, and u/s for growth and position. Bp noted.  Pt without complaints.  U/s today indicates persistent breech presentation.    Pt sent to [facility] for further evaluation.    GBS pos.

## 2023-10-24 NOTE — NURSING NOTE
Discussed & reviewed home written instructions with pt & spouse.  Verbalized understanding.  Instructed to return to hospital 10/25/23 at 2pm for NST, injection, and serial B/P's.  Also to make an appointment for 10/26/23 to see MD in office.  Discharged home.  Ambulatory and accompanied by spouse.

## 2023-10-25 ENCOUNTER — HOSPITAL ENCOUNTER (OUTPATIENT)
Dept: LABOR AND DELIVERY | Facility: HOSPITAL | Age: 23
Discharge: HOME | End: 2023-10-25
Payer: COMMERCIAL

## 2023-10-25 ENCOUNTER — HOSPITAL ENCOUNTER (OUTPATIENT)
Facility: HOSPITAL | Age: 23
Discharge: HOME | End: 2023-10-25
Attending: STUDENT IN AN ORGANIZED HEALTH CARE EDUCATION/TRAINING PROGRAM | Admitting: STUDENT IN AN ORGANIZED HEALTH CARE EDUCATION/TRAINING PROGRAM
Payer: COMMERCIAL

## 2023-10-25 VITALS
SYSTOLIC BLOOD PRESSURE: 131 MMHG | TEMPERATURE: 98.3 F | RESPIRATION RATE: 16 BRPM | HEART RATE: 75 BPM | DIASTOLIC BLOOD PRESSURE: 74 MMHG

## 2023-10-25 LAB
AMPHET+METHAMPHET UR QL: NEGATIVE
AMPHETAMINES UR QL: NEGATIVE
BACTERIA UR QL AUTO: ABNORMAL /HPF
BARBITURATES UR QL SCN: NEGATIVE
BENZODIAZ UR QL SCN: NEGATIVE
BILIRUB UR QL STRIP: NEGATIVE
BUPRENORPHINE SERPL-MCNC: NEGATIVE NG/ML
CANNABINOIDS SERPL QL: NEGATIVE
CLARITY UR: CLEAR
COCAINE UR QL: NEGATIVE
COLOR UR: YELLOW
GLUCOSE UR STRIP-MCNC: NEGATIVE MG/DL
HGB UR QL STRIP.AUTO: NEGATIVE
HYALINE CASTS UR QL AUTO: ABNORMAL /LPF
KETONES UR QL STRIP: NEGATIVE
LEUKOCYTE ESTERASE UR QL STRIP.AUTO: ABNORMAL
METHADONE UR QL SCN: NEGATIVE
NITRITE UR QL STRIP: NEGATIVE
OPIATES UR QL: NEGATIVE
OXYCODONE UR QL SCN: NEGATIVE
PCP UR QL SCN: NEGATIVE
PH UR STRIP.AUTO: 6 [PH] (ref 4.5–8)
PROPOXYPH UR QL: NEGATIVE
PROT UR QL STRIP: ABNORMAL
RBC # UR STRIP: ABNORMAL /HPF
REF LAB TEST METHOD: ABNORMAL
SP GR UR STRIP: 1.03 (ref 1–1.03)
SQUAMOUS #/AREA URNS HPF: ABNORMAL /HPF
TRICYCLICS UR QL SCN: NEGATIVE
UROBILINOGEN UR QL STRIP: ABNORMAL
WBC # UR STRIP: ABNORMAL /HPF

## 2023-10-25 PROCEDURE — 81001 URINALYSIS AUTO W/SCOPE: CPT | Performed by: STUDENT IN AN ORGANIZED HEALTH CARE EDUCATION/TRAINING PROGRAM

## 2023-10-25 PROCEDURE — 59025 FETAL NON-STRESS TEST: CPT | Performed by: STUDENT IN AN ORGANIZED HEALTH CARE EDUCATION/TRAINING PROGRAM

## 2023-10-25 PROCEDURE — 80306 DRUG TEST PRSMV INSTRMNT: CPT | Performed by: STUDENT IN AN ORGANIZED HEALTH CARE EDUCATION/TRAINING PROGRAM

## 2023-10-25 PROCEDURE — 87086 URINE CULTURE/COLONY COUNT: CPT | Performed by: STUDENT IN AN ORGANIZED HEALTH CARE EDUCATION/TRAINING PROGRAM

## 2023-10-25 PROCEDURE — 59025 FETAL NON-STRESS TEST: CPT

## 2023-10-25 PROCEDURE — 96372 THER/PROPH/DIAG INJ SC/IM: CPT

## 2023-10-25 PROCEDURE — 25010000002 BETAMETHASONE ACET & SOD PHOS PER 4 MG: Performed by: STUDENT IN AN ORGANIZED HEALTH CARE EDUCATION/TRAINING PROGRAM

## 2023-10-25 RX ORDER — BETAMETHASONE SODIUM PHOSPHATE AND BETAMETHASONE ACETATE 3; 3 MG/ML; MG/ML
12 INJECTION, SUSPENSION INTRA-ARTICULAR; INTRALESIONAL; INTRAMUSCULAR; SOFT TISSUE ONCE
Status: COMPLETED | OUTPATIENT
Start: 2023-10-25 | End: 2023-10-25

## 2023-10-25 RX ADMIN — BETAMETHASONE SODIUM PHOSPHATE AND BETAMETHASONE ACETATE 12 MG: 3; 3 INJECTION, SUSPENSION INTRA-ARTICULAR; INTRALESIONAL; INTRAMUSCULAR at 14:21

## 2023-10-25 NOTE — NURSING NOTE
Pt here for second dose of BMZ, BP check and NST.  NST reactive, BP WDL and BMZ given.  Pt denies H/A, vision change, pain and is feeling her baby move well  MD aware.  D/c instructions discussed w/ pt and SO - they verbalized understanding and all questions answered.  Pt will go to her apt tomorrow and aware of s/s to call MD or go to ER.  Pt denies any needs or concerns at this time and d/c'ed home in stable ambulatory condition.

## 2023-10-25 NOTE — NON STRESS TEST
Giuliana Pan, a  at 36w6d with an NORBERTO of 2023, by Last Menstrual Period, was seen at [facility] for a nonstress test.    Chief Complaint   Patient presents with    Non-stress Test     Pt here for second dose of betamethasone and NST for elevated BP       Patient Active Problem List   Diagnosis    Maternal varicella, non-immune    Prenatal care in third trimester    Obesity (BMI 30-39.9)    Need for Tdap vaccination    Maternal care for breech presentation, single gestation    Pregnancy       Start Time: 1410  Stop Time: 1455    Interpretation A  Nonstress Test Interpretation A: Reactive

## 2023-10-26 ENCOUNTER — ROUTINE PRENATAL (OUTPATIENT)
Dept: OBSTETRICS AND GYNECOLOGY | Facility: CLINIC | Age: 23
End: 2023-10-26
Payer: COMMERCIAL

## 2023-10-26 VITALS — WEIGHT: 260 LBS | DIASTOLIC BLOOD PRESSURE: 70 MMHG | BODY MASS INDEX: 36.26 KG/M2 | SYSTOLIC BLOOD PRESSURE: 130 MMHG

## 2023-10-26 DIAGNOSIS — Z28.39 MATERNAL VARICELLA, NON-IMMUNE: ICD-10-CM

## 2023-10-26 DIAGNOSIS — Z34.90 PREGNANCY, UNSPECIFIED GESTATIONAL AGE: ICD-10-CM

## 2023-10-26 DIAGNOSIS — O99.820 GBS (GROUP B STREPTOCOCCUS CARRIER), +RV CULTURE, CURRENTLY PREGNANT: ICD-10-CM

## 2023-10-26 DIAGNOSIS — Z34.93 PRENATAL CARE IN THIRD TRIMESTER: ICD-10-CM

## 2023-10-26 DIAGNOSIS — D50.8 OTHER IRON DEFICIENCY ANEMIA: ICD-10-CM

## 2023-10-26 DIAGNOSIS — Z36.9 ENCOUNTER FOR ANTENATAL SCREENING, UNSPECIFIED: Primary | ICD-10-CM

## 2023-10-26 DIAGNOSIS — E66.9 OBESITY (BMI 30-39.9): ICD-10-CM

## 2023-10-26 DIAGNOSIS — O09.899 MATERNAL VARICELLA, NON-IMMUNE: ICD-10-CM

## 2023-10-26 LAB
BACTERIA SPEC AEROBE CULT: NO GROWTH
BILIRUB BLD-MCNC: NEGATIVE MG/DL
CLARITY, POC: CLEAR
COLOR UR: YELLOW
GLUCOSE UR STRIP-MCNC: NEGATIVE MG/DL
KETONES UR QL: NEGATIVE
LEUKOCYTE EST, POC: NEGATIVE
NITRITE UR-MCNC: NEGATIVE MG/ML
PH UR: 5 [PH] (ref 5–8)
PROT UR STRIP-MCNC: NEGATIVE MG/DL
RBC # UR STRIP: NEGATIVE /UL
SP GR UR: 1 (ref 1–1.03)
UROBILINOGEN UR QL: NORMAL

## 2023-10-27 ENCOUNTER — TELEPHONE (OUTPATIENT)
Dept: OBSTETRICS AND GYNECOLOGY | Facility: CLINIC | Age: 23
End: 2023-10-27
Payer: COMMERCIAL

## 2023-10-28 PROBLEM — O12.10 PROTEINURIA AFFECTING PREGNANCY, ANTEPARTUM: Status: ACTIVE | Noted: 2023-10-28

## 2023-10-28 PROBLEM — D50.9 IRON DEFICIENCY ANEMIA: Status: ACTIVE | Noted: 2023-10-28

## 2023-10-28 PROBLEM — O99.820 GBS (GROUP B STREPTOCOCCUS CARRIER), +RV CULTURE, CURRENTLY PREGNANT: Status: ACTIVE | Noted: 2023-10-28

## 2023-10-28 PROBLEM — Z23 NEED FOR TDAP VACCINATION: Status: RESOLVED | Noted: 2023-08-24 | Resolved: 2023-10-28

## 2023-10-28 NOTE — PROGRESS NOTES
OB follow up     Giuliana Pan is a 23 y.o.  37w0d being seen today for her obstetrical visit.  Patient reports no complaints. Fetal movement: normal. She denies any HA, visual changes, RUQ pain or swelling. She declines a flu shot.     Review of Systems  No bleeding, No cramping/contractions     /70   Wt 118 kg (260 lb)   LMP 2023   BMI 36.26 kg/m²     FHT: 145 BPM   Uterine Size: 36  cm     Long/FT/_ 4    Assessment/Plan:    1) 23 y.o.  -pregnancy at 37w2d- doing well.     2) Proteinuria- pt was seen earlier this week and had a BP of 160/100 in the office. She was sent to L&D and was normotensive but her Pr:Cr was elevated at 385 mg.  She has remained normotensive and asymptomatic and has received BMZ x 2. Dr Jin, Nathalia and I discussed her case and rec delivery at 38 weeks. She is breech so will be scheduled for C/S. Will see pt on Monday for BPP/NST and labs. US on 10/17/2023 showed growth = 41% ( 5.14#). S/Sx of preeclampsia d/w pt.     3) Breech- plan 1 LTCS for  at 38.0    4) GBS +- will need ABX if labors    5) VNI- avoid those with fevers and rashes. Plan Varivax pp    6) Declines flu shot    7) Anemia- HgB 11.5, pt given samples of iron.    8) Reviewed this stage of pregnancy    9)Problem list updated     10) RTO Monday for OB int, NST/BPP and labs      Brenda Hopper MD    10/26/2023  17:46 EDT

## 2023-10-30 ENCOUNTER — ROUTINE PRENATAL (OUTPATIENT)
Dept: OBSTETRICS AND GYNECOLOGY | Facility: CLINIC | Age: 23
End: 2023-10-30
Payer: COMMERCIAL

## 2023-10-30 VITALS — BODY MASS INDEX: 35.98 KG/M2 | DIASTOLIC BLOOD PRESSURE: 76 MMHG | SYSTOLIC BLOOD PRESSURE: 132 MMHG | WEIGHT: 258 LBS

## 2023-10-30 DIAGNOSIS — O99.820 GBS (GROUP B STREPTOCOCCUS CARRIER), +RV CULTURE, CURRENTLY PREGNANT: ICD-10-CM

## 2023-10-30 DIAGNOSIS — E66.9 OBESITY (BMI 30-39.9): ICD-10-CM

## 2023-10-30 DIAGNOSIS — O12.10 PROTEINURIA AFFECTING PREGNANCY, ANTEPARTUM: ICD-10-CM

## 2023-10-30 DIAGNOSIS — O09.899 MATERNAL VARICELLA, NON-IMMUNE: ICD-10-CM

## 2023-10-30 DIAGNOSIS — Z28.39 MATERNAL VARICELLA, NON-IMMUNE: ICD-10-CM

## 2023-10-30 DIAGNOSIS — Z34.93 PRENATAL CARE IN THIRD TRIMESTER: ICD-10-CM

## 2023-10-30 DIAGNOSIS — Z36.9 ENCOUNTER FOR ANTENATAL SCREENING, UNSPECIFIED: Primary | ICD-10-CM

## 2023-10-30 PROBLEM — O13.9 GESTATIONAL HYPERTENSION, ANTEPARTUM: Status: ACTIVE | Noted: 2023-10-30

## 2023-10-30 LAB
BILIRUB BLD-MCNC: NEGATIVE MG/DL
CLARITY, POC: CLEAR
COLOR UR: YELLOW
GLUCOSE UR STRIP-MCNC: NEGATIVE MG/DL
KETONES UR QL: NEGATIVE
LEUKOCYTE EST, POC: NEGATIVE
NITRITE UR-MCNC: NEGATIVE MG/ML
PH UR: 5 [PH] (ref 5–8)
PROT UR STRIP-MCNC: NEGATIVE MG/DL
RBC # UR STRIP: NEGATIVE /UL
SP GR UR: 1.03 (ref 1–1.03)
UROBILINOGEN UR QL: NORMAL

## 2023-10-30 NOTE — PROGRESS NOTES
OB follow up     Giuliana Pan is a 23 y.o.  37+ being seen today for her obstetrical visit.  Patient reports no complaints. Fetal movement: normal.  Here with significant other.     Had her US and NST today       Review of Systems  No bleeding, No cramping/contractions, no SROM    /76   Wt 117 kg (258 lb)   LMP 2023   BMI 35.98 kg/m²     Vitals: VSS; AF    General Appearance:  Awake. Alert. Well developed. Well nourished. In no acute distress.    Visual Inspection: ° Abdomen was normal on visual inspection.  Palpation: ° Abdomen was soft. ° Abdominal non-tender.    Uterus: ° Fundal height was normal for gestational age. ° Not tender.  Uterine Adnexae: ° Normal without masses or tenderness.  Neurological:  ° Oriented to time, place, and person.  Skin:  ° General appearance was normal. No bruising or ecchymosis.  Obstetrical: +FM and FCA     Presentation: Breech  Placenta: Posterior  JAMIE: 15cm  FCA: 140's    BPP 88        Ultrasound images and report reviewed personally by me.    Full interpretation of ultrasound can be found in the patient's note on the same date of service.     Froylan Haque, DO      Labs    Protein/Creatinine Ratio, Urine  0.0 - 200.0 mg/G Crea 385.8 High          Assessment/Plan:    1) 23 y.o.  -pregnancy at 37+  GBS +  Breech today      2) s/p Tdap vaccine    3) BMI 30- HgBA1c= 5.5.  passed 2hr GTT. US growth @ 28wga- 59% (2.12#). Repeat US growth 32 (41%) and 36 weeks (41%)    4)  VNI- avoid people with fevers and rashes. Varivax pp    5) Declines CF/SMA/FX/ECS    6) NIPT low risk, missed AFP    7) GHTN concern   Proteinuria and x1 elevated B/P.   Pt and I discussed with concern for GHTN and proteinuria would recommend delivery at 38wga. Discussed NICU risk for infant.   Dx for GHTN or Pre-e w/out SF would be met with x1 B/P > 140/90     8) Flu vaccine- Encouraged the flu vaccine during pregnancy. Discuss normal physiological changes during pregnancy increase the  susceptibility of the flu virus and increase the risk of severe illness for the pregnant woman. Disc flu can be harmful to the unborn baby as well. Enc the flu vaccine. Disc with patient that getting the flu vaccine is the first and most important step in protecting against the flu. Flu vaccines given during pregnancy help protect both the mother and the baby. Getting the flu vaccine during pregnancy also helps protect the  from flu illness for several months after their birth, when then are too young to get vaccinated. Also disc the importance of good hand hygiene and avoiding people who are sick. The patient declines the flu vaccine.     9) Peds- [facility]. Male infant- Undecided on circ. Breast. Considering epidrual.     10) Breech presentation  Schedule 2nov23  PLTCS for GHTN vs Pre-e w/out SF @ 38wga . We discussed with Dr Hopper, Dr Sloan and myself.       Reviewed this stage of pregnancy  Problem list updated     Froylan Haque DO    10/30/2023  10:35 EDT

## 2023-10-31 ENCOUNTER — PREP FOR SURGERY (OUTPATIENT)
Dept: OTHER | Facility: HOSPITAL | Age: 23
End: 2023-10-31
Payer: COMMERCIAL

## 2023-10-31 ENCOUNTER — TELEPHONE (OUTPATIENT)
Dept: OBSTETRICS AND GYNECOLOGY | Facility: CLINIC | Age: 23
End: 2023-10-31
Payer: COMMERCIAL

## 2023-10-31 RX ORDER — SODIUM CHLORIDE, SODIUM LACTATE, POTASSIUM CHLORIDE, CALCIUM CHLORIDE 600; 310; 30; 20 MG/100ML; MG/100ML; MG/100ML; MG/100ML
125 INJECTION, SOLUTION INTRAVENOUS CONTINUOUS
Status: CANCELLED | OUTPATIENT
Start: 2023-10-31

## 2023-10-31 RX ORDER — OXYTOCIN/0.9 % SODIUM CHLORIDE 30/500 ML
999 PLASTIC BAG, INJECTION (ML) INTRAVENOUS ONCE
Status: CANCELLED | OUTPATIENT
Start: 2023-10-31

## 2023-10-31 RX ORDER — SODIUM CHLORIDE 0.9 % (FLUSH) 0.9 %
10 SYRINGE (ML) INJECTION EVERY 12 HOURS SCHEDULED
Status: CANCELLED | OUTPATIENT
Start: 2023-10-31

## 2023-10-31 RX ORDER — METHYLERGONOVINE MALEATE 0.2 MG/ML
200 INJECTION INTRAVENOUS ONCE AS NEEDED
Status: CANCELLED | OUTPATIENT
Start: 2023-10-31

## 2023-10-31 RX ORDER — LIDOCAINE HYDROCHLORIDE 10 MG/ML
0.5 INJECTION, SOLUTION INFILTRATION; PERINEURAL ONCE AS NEEDED
Status: CANCELLED | OUTPATIENT
Start: 2023-10-31

## 2023-10-31 RX ORDER — SODIUM CHLORIDE 0.9 % (FLUSH) 0.9 %
10 SYRINGE (ML) INJECTION AS NEEDED
Status: CANCELLED | OUTPATIENT
Start: 2023-10-31

## 2023-10-31 RX ORDER — KETOROLAC TROMETHAMINE 30 MG/ML
30 INJECTION, SOLUTION INTRAMUSCULAR; INTRAVENOUS ONCE
Status: CANCELLED | OUTPATIENT
Start: 2023-10-31 | End: 2023-10-31

## 2023-10-31 RX ORDER — MISOPROSTOL 200 UG/1
800 TABLET ORAL AS NEEDED
Status: CANCELLED | OUTPATIENT
Start: 2023-10-31

## 2023-10-31 RX ORDER — SODIUM CHLORIDE 9 MG/ML
40 INJECTION, SOLUTION INTRAVENOUS AS NEEDED
Status: CANCELLED | OUTPATIENT
Start: 2023-10-31

## 2023-10-31 RX ORDER — CEFAZOLIN SODIUM 2 G/50ML
2 SOLUTION INTRAVENOUS ONCE
Status: CANCELLED | OUTPATIENT
Start: 2023-10-31 | End: 2023-10-31

## 2023-10-31 RX ORDER — CARBOPROST TROMETHAMINE 250 UG/ML
250 INJECTION, SOLUTION INTRAMUSCULAR AS NEEDED
Status: CANCELLED | OUTPATIENT
Start: 2023-10-31

## 2023-10-31 RX ORDER — OXYTOCIN/0.9 % SODIUM CHLORIDE 30/500 ML
250 PLASTIC BAG, INJECTION (ML) INTRAVENOUS CONTINUOUS
Status: CANCELLED | OUTPATIENT
Start: 2023-10-31 | End: 2023-10-31

## 2023-10-31 RX ORDER — ACETAMINOPHEN 500 MG
1000 TABLET ORAL ONCE
Status: CANCELLED | OUTPATIENT
Start: 2023-10-31 | End: 2023-10-31

## 2023-11-01 ENCOUNTER — TELEPHONE (OUTPATIENT)
Dept: OBSTETRICS AND GYNECOLOGY | Facility: HOSPITAL | Age: 23
End: 2023-11-01

## 2023-11-01 ENCOUNTER — ANESTHESIA EVENT (OUTPATIENT)
Dept: OBSTETRICS AND GYNECOLOGY | Facility: HOSPITAL | Age: 23
End: 2023-11-01
Payer: COMMERCIAL

## 2023-11-01 NOTE — TELEPHONE ENCOUNTER
Spoke with Giuliana this am via phone @ 10:50. Went over ERAS instructions. She is aware she is to take x2 Tylenol 500mg tablets orally this evening and to drink 20 oz of gatorade (not red) two hours before her given arrival time to the hospital tomorrow @ 0800am. Verbalized all instructions and went over visitation. -FREDERIC Linn.

## 2023-11-02 ENCOUNTER — HOSPITAL ENCOUNTER (INPATIENT)
Facility: HOSPITAL | Age: 23
LOS: 2 days | Discharge: HOME | End: 2023-11-04
Attending: OBSTETRICS & GYNECOLOGY | Admitting: OBSTETRICS & GYNECOLOGY
Payer: COMMERCIAL

## 2023-11-02 ENCOUNTER — ANESTHESIA (OUTPATIENT)
Dept: OBSTETRICS AND GYNECOLOGY | Facility: HOSPITAL | Age: 23
End: 2023-11-02
Payer: COMMERCIAL

## 2023-11-02 PROBLEM — O13.9 GESTATIONAL HYPERTENSION, ANTEPARTUM: Status: RESOLVED | Noted: 2023-10-30 | Resolved: 2023-11-02

## 2023-11-02 PROBLEM — Z34.93 PRENATAL CARE IN THIRD TRIMESTER: Status: RESOLVED | Noted: 2023-06-05 | Resolved: 2023-11-02

## 2023-11-02 PROBLEM — O13.3 GESTATIONAL HYPERTENSION, THIRD TRIMESTER: Status: ACTIVE | Noted: 2023-11-02

## 2023-11-02 LAB
ABO GROUP BLD: NORMAL
ABO GROUP BLD: NORMAL
ALBUMIN SERPL-MCNC: 3.3 G/DL (ref 3.5–5.2)
ALBUMIN/GLOB SERPL: 1 G/DL
ALP SERPL-CCNC: 87 U/L (ref 39–117)
ALT SERPL W P-5'-P-CCNC: 10 U/L (ref 1–33)
AMPHET+METHAMPHET UR QL: NEGATIVE
AMPHETAMINES UR QL: NEGATIVE
ANION GAP SERPL CALCULATED.3IONS-SCNC: 11.1 MMOL/L (ref 5–15)
AST SERPL-CCNC: 14 U/L (ref 1–32)
BACTERIA UR QL AUTO: ABNORMAL /HPF
BARBITURATES UR QL SCN: NEGATIVE
BENZODIAZ UR QL SCN: NEGATIVE
BILIRUB SERPL-MCNC: 0.2 MG/DL (ref 0–1.2)
BILIRUB UR QL STRIP: NEGATIVE
BLD GP AB SCN SERPL QL: NEGATIVE
BUN SERPL-MCNC: 9 MG/DL (ref 6–20)
BUN/CREAT SERPL: 16.4 (ref 7–25)
BUPRENORPHINE SERPL-MCNC: NEGATIVE NG/ML
CALCIUM SPEC-SCNC: 8.6 MG/DL (ref 8.6–10.5)
CANNABINOIDS SERPL QL: NEGATIVE
CHLORIDE SERPL-SCNC: 99 MMOL/L (ref 98–107)
CLARITY UR: CLEAR
CO2 SERPL-SCNC: 20.9 MMOL/L (ref 22–29)
COCAINE UR QL: NEGATIVE
COLOR UR: YELLOW
CREAT SERPL-MCNC: 0.55 MG/DL (ref 0.57–1)
DEPRECATED RDW RBC AUTO: 46.1 FL (ref 37–54)
EGFRCR SERPLBLD CKD-EPI 2021: 130.6 ML/MIN/1.73
ERYTHROCYTE [DISTWIDTH] IN BLOOD BY AUTOMATED COUNT: 15.3 % (ref 12.3–15.4)
GLOBULIN UR ELPH-MCNC: 3.4 GM/DL
GLUCOSE SERPL-MCNC: 78 MG/DL (ref 65–99)
GLUCOSE UR STRIP-MCNC: NEGATIVE MG/DL
HCT VFR BLD AUTO: 36.8 % (ref 34–46.6)
HGB BLD-MCNC: 11.8 G/DL (ref 12–15.9)
HGB UR QL STRIP.AUTO: NEGATIVE
HYALINE CASTS UR QL AUTO: ABNORMAL /LPF
KETONES UR QL STRIP: NEGATIVE
LEUKOCYTE ESTERASE UR QL STRIP.AUTO: ABNORMAL
MCH RBC QN AUTO: 26.9 PG (ref 26.6–33)
MCHC RBC AUTO-ENTMCNC: 32.1 G/DL (ref 31.5–35.7)
MCV RBC AUTO: 83.8 FL (ref 79–97)
METHADONE UR QL SCN: NEGATIVE
NITRITE UR QL STRIP: NEGATIVE
OPIATES UR QL: NEGATIVE
OXYCODONE UR QL SCN: NEGATIVE
PCP UR QL SCN: NEGATIVE
PH UR STRIP.AUTO: 7 [PH] (ref 4.5–8)
PLATELET # BLD AUTO: 320 10*3/MM3 (ref 140–450)
PMV BLD AUTO: 10.9 FL (ref 6–12)
POTASSIUM SERPL-SCNC: 3.5 MMOL/L (ref 3.5–5.2)
PROPOXYPH UR QL: NEGATIVE
PROT SERPL-MCNC: 6.7 G/DL (ref 6–8.5)
PROT UR QL STRIP: NEGATIVE
RBC # BLD AUTO: 4.39 10*6/MM3 (ref 3.77–5.28)
RBC # UR STRIP: ABNORMAL /HPF
REF LAB TEST METHOD: ABNORMAL
RH BLD: POSITIVE
RH BLD: POSITIVE
SODIUM SERPL-SCNC: 131 MMOL/L (ref 136–145)
SP GR UR STRIP: 1.02 (ref 1–1.03)
SQUAMOUS #/AREA URNS HPF: ABNORMAL /HPF
T&S EXPIRATION DATE: NORMAL
TRICYCLICS UR QL SCN: NEGATIVE
UROBILINOGEN UR QL STRIP: ABNORMAL
WBC # UR STRIP: ABNORMAL /HPF
WBC NRBC COR # BLD: 11.43 10*3/MM3 (ref 3.4–10.8)

## 2023-11-02 PROCEDURE — 88307 TISSUE EXAM BY PATHOLOGIST: CPT

## 2023-11-02 PROCEDURE — 86900 BLOOD TYPING SEROLOGIC ABO: CPT

## 2023-11-02 PROCEDURE — 25010000002 KETOROLAC TROMETHAMINE PER 15 MG: Performed by: OBSTETRICS & GYNECOLOGY

## 2023-11-02 PROCEDURE — 94664 DEMO&/EVAL PT USE INHALER: CPT

## 2023-11-02 PROCEDURE — 25810000003 LACTATED RINGERS PER 1000 ML: Performed by: OBSTETRICS & GYNECOLOGY

## 2023-11-02 PROCEDURE — 59510 CESAREAN DELIVERY: CPT | Performed by: OBSTETRICS & GYNECOLOGY

## 2023-11-02 PROCEDURE — 25810000003 SODIUM CHLORIDE 0.9 % SOLUTION 250 ML FLEX CONT: Performed by: OBSTETRICS & GYNECOLOGY

## 2023-11-02 PROCEDURE — 80053 COMPREHEN METABOLIC PANEL: CPT | Performed by: OBSTETRICS & GYNECOLOGY

## 2023-11-02 PROCEDURE — 25010000002 BUPIVACAINE PF 0.75 % SOLUTION: Performed by: NURSE ANESTHETIST, CERTIFIED REGISTERED

## 2023-11-02 PROCEDURE — 25010000002 MORPHINE PER 10 MG: Performed by: NURSE ANESTHETIST, CERTIFIED REGISTERED

## 2023-11-02 PROCEDURE — 86901 BLOOD TYPING SEROLOGIC RH(D): CPT | Performed by: OBSTETRICS & GYNECOLOGY

## 2023-11-02 PROCEDURE — 85027 COMPLETE CBC AUTOMATED: CPT | Performed by: OBSTETRICS & GYNECOLOGY

## 2023-11-02 PROCEDURE — 63710000001 ONDANSETRON PER 8 MG: Performed by: OBSTETRICS & GYNECOLOGY

## 2023-11-02 PROCEDURE — 86900 BLOOD TYPING SEROLOGIC ABO: CPT | Performed by: OBSTETRICS & GYNECOLOGY

## 2023-11-02 PROCEDURE — 81001 URINALYSIS AUTO W/SCOPE: CPT | Performed by: OBSTETRICS & GYNECOLOGY

## 2023-11-02 PROCEDURE — 86901 BLOOD TYPING SEROLOGIC RH(D): CPT

## 2023-11-02 PROCEDURE — 80306 DRUG TEST PRSMV INSTRMNT: CPT | Performed by: OBSTETRICS & GYNECOLOGY

## 2023-11-02 PROCEDURE — 25010000002 CEFAZOLIN SODIUM-DEXTROSE 2-3 GM-%(50ML) RECONSTITUTED SOLUTION: Performed by: OBSTETRICS & GYNECOLOGY

## 2023-11-02 PROCEDURE — 25010000002 AZITHROMYCIN PER 500 MG: Performed by: OBSTETRICS & GYNECOLOGY

## 2023-11-02 PROCEDURE — 25810000003 LACTATED RINGERS SOLUTION: Performed by: OBSTETRICS & GYNECOLOGY

## 2023-11-02 PROCEDURE — 86850 RBC ANTIBODY SCREEN: CPT | Performed by: OBSTETRICS & GYNECOLOGY

## 2023-11-02 PROCEDURE — 87086 URINE CULTURE/COLONY COUNT: CPT | Performed by: OBSTETRICS & GYNECOLOGY

## 2023-11-02 PROCEDURE — 25010000002 FENTANYL CITRATE (PF) 50 MCG/ML SOLUTION: Performed by: NURSE ANESTHETIST, CERTIFIED REGISTERED

## 2023-11-02 PROCEDURE — S0260 H&P FOR SURGERY: HCPCS | Performed by: OBSTETRICS & GYNECOLOGY

## 2023-11-02 PROCEDURE — 25010000002 ONDANSETRON PER 1 MG: Performed by: NURSE ANESTHETIST, CERTIFIED REGISTERED

## 2023-11-02 RX ORDER — OXYTOCIN/0.9 % SODIUM CHLORIDE 30/500 ML
PLASTIC BAG, INJECTION (ML) INTRAVENOUS CONTINUOUS PRN
Status: DISCONTINUED | OUTPATIENT
Start: 2023-11-02 | End: 2023-11-02 | Stop reason: SURG

## 2023-11-02 RX ORDER — KETOROLAC TROMETHAMINE 30 MG/ML
15 INJECTION, SOLUTION INTRAMUSCULAR; INTRAVENOUS EVERY 6 HOURS
Status: COMPLETED | OUTPATIENT
Start: 2023-11-02 | End: 2023-11-03

## 2023-11-02 RX ORDER — ACETAMINOPHEN 500 MG
1000 TABLET ORAL EVERY 6 HOURS
Status: COMPLETED | OUTPATIENT
Start: 2023-11-02 | End: 2023-11-03

## 2023-11-02 RX ORDER — OXYTOCIN/0.9 % SODIUM CHLORIDE 30/500 ML
125 PLASTIC BAG, INJECTION (ML) INTRAVENOUS CONTINUOUS PRN
Status: DISCONTINUED | OUTPATIENT
Start: 2023-11-02 | End: 2023-11-04 | Stop reason: HOSPADM

## 2023-11-02 RX ORDER — CEFAZOLIN SODIUM 2 G/50ML
2 SOLUTION INTRAVENOUS ONCE
Status: DISCONTINUED | OUTPATIENT
Start: 2023-11-02 | End: 2023-11-02

## 2023-11-02 RX ORDER — ONDANSETRON 4 MG/1
4 TABLET, FILM COATED ORAL EVERY 8 HOURS PRN
Status: DISCONTINUED | OUTPATIENT
Start: 2023-11-02 | End: 2023-11-04 | Stop reason: HOSPADM

## 2023-11-02 RX ORDER — IBUPROFEN 600 MG/1
600 TABLET ORAL EVERY 6 HOURS
Status: DISCONTINUED | OUTPATIENT
Start: 2023-11-03 | End: 2023-11-04 | Stop reason: HOSPADM

## 2023-11-02 RX ORDER — OXYTOCIN/0.9 % SODIUM CHLORIDE 30/500 ML
250 PLASTIC BAG, INJECTION (ML) INTRAVENOUS CONTINUOUS
Status: ACTIVE | OUTPATIENT
Start: 2023-11-02 | End: 2023-11-02

## 2023-11-02 RX ORDER — MORPHINE SULFATE 1 MG/ML
INJECTION, SOLUTION EPIDURAL; INTRATHECAL; INTRAVENOUS AS NEEDED
Status: DISCONTINUED | OUTPATIENT
Start: 2023-11-02 | End: 2023-11-02 | Stop reason: SURG

## 2023-11-02 RX ORDER — ACETAMINOPHEN 325 MG/1
650 TABLET ORAL EVERY 6 HOURS
Status: DISCONTINUED | OUTPATIENT
Start: 2023-11-03 | End: 2023-11-04 | Stop reason: HOSPADM

## 2023-11-02 RX ORDER — OXYCODONE HYDROCHLORIDE 5 MG/1
5 TABLET ORAL EVERY 4 HOURS PRN
Status: DISCONTINUED | OUTPATIENT
Start: 2023-11-02 | End: 2023-11-04 | Stop reason: HOSPADM

## 2023-11-02 RX ORDER — SODIUM CHLORIDE 9 MG/ML
40 INJECTION, SOLUTION INTRAVENOUS AS NEEDED
Status: DISCONTINUED | OUTPATIENT
Start: 2023-11-02 | End: 2023-11-02

## 2023-11-02 RX ORDER — KETOROLAC TROMETHAMINE 30 MG/ML
30 INJECTION, SOLUTION INTRAMUSCULAR; INTRAVENOUS ONCE
Status: COMPLETED | OUTPATIENT
Start: 2023-11-02 | End: 2023-11-02

## 2023-11-02 RX ORDER — OXYTOCIN/0.9 % SODIUM CHLORIDE 30/500 ML
999 PLASTIC BAG, INJECTION (ML) INTRAVENOUS ONCE
Status: DISCONTINUED | OUTPATIENT
Start: 2023-11-02 | End: 2023-11-04 | Stop reason: HOSPADM

## 2023-11-02 RX ORDER — SODIUM CHLORIDE, SODIUM LACTATE, POTASSIUM CHLORIDE, CALCIUM CHLORIDE 600; 310; 30; 20 MG/100ML; MG/100ML; MG/100ML; MG/100ML
125 INJECTION, SOLUTION INTRAVENOUS CONTINUOUS
Status: DISCONTINUED | OUTPATIENT
Start: 2023-11-02 | End: 2023-11-04 | Stop reason: HOSPADM

## 2023-11-02 RX ORDER — POLYETHYLENE GLYCOL 3350 17 G/17G
17 POWDER, FOR SOLUTION ORAL DAILY
Status: DISCONTINUED | OUTPATIENT
Start: 2023-11-02 | End: 2023-11-04 | Stop reason: HOSPADM

## 2023-11-02 RX ORDER — ACETAMINOPHEN 500 MG
1000 TABLET ORAL ONCE
Status: DISCONTINUED | OUTPATIENT
Start: 2023-11-02 | End: 2023-11-02

## 2023-11-02 RX ORDER — FENTANYL CITRATE 50 UG/ML
INJECTION, SOLUTION INTRAMUSCULAR; INTRAVENOUS AS NEEDED
Status: DISCONTINUED | OUTPATIENT
Start: 2023-11-02 | End: 2023-11-02 | Stop reason: SURG

## 2023-11-02 RX ORDER — PRENATAL VIT/IRON FUM/FOLIC AC 27MG-0.8MG
1 TABLET ORAL DAILY
Status: DISCONTINUED | OUTPATIENT
Start: 2023-11-02 | End: 2023-11-04 | Stop reason: HOSPADM

## 2023-11-02 RX ORDER — LIDOCAINE HYDROCHLORIDE 10 MG/ML
0.5 INJECTION, SOLUTION INFILTRATION; PERINEURAL ONCE AS NEEDED
Status: DISCONTINUED | OUTPATIENT
Start: 2023-11-02 | End: 2023-11-02

## 2023-11-02 RX ORDER — BUPIVACAINE HYDROCHLORIDE 7.5 MG/ML
INJECTION, SOLUTION EPIDURAL; RETROBULBAR
Status: COMPLETED | OUTPATIENT
Start: 2023-11-02 | End: 2023-11-02

## 2023-11-02 RX ORDER — ONDANSETRON 2 MG/ML
INJECTION INTRAMUSCULAR; INTRAVENOUS AS NEEDED
Status: DISCONTINUED | OUTPATIENT
Start: 2023-11-02 | End: 2023-11-02 | Stop reason: SURG

## 2023-11-02 RX ORDER — FAMOTIDINE 10 MG/ML
INJECTION, SOLUTION INTRAVENOUS
Status: COMPLETED
Start: 2023-11-02 | End: 2023-11-02

## 2023-11-02 RX ORDER — SODIUM CHLORIDE 0.9 % (FLUSH) 0.9 %
10 SYRINGE (ML) INJECTION EVERY 12 HOURS SCHEDULED
Status: DISCONTINUED | OUTPATIENT
Start: 2023-11-02 | End: 2023-11-02

## 2023-11-02 RX ORDER — OXYTOCIN/0.9 % SODIUM CHLORIDE 30/500 ML
PLASTIC BAG, INJECTION (ML) INTRAVENOUS
Status: COMPLETED
Start: 2023-11-02 | End: 2023-11-02

## 2023-11-02 RX ORDER — SODIUM CHLORIDE 0.9 % (FLUSH) 0.9 %
10 SYRINGE (ML) INJECTION AS NEEDED
Status: DISCONTINUED | OUTPATIENT
Start: 2023-11-02 | End: 2023-11-02

## 2023-11-02 RX ORDER — OXYCODONE HYDROCHLORIDE 5 MG/1
10 TABLET ORAL EVERY 4 HOURS PRN
Status: DISCONTINUED | OUTPATIENT
Start: 2023-11-02 | End: 2023-11-04 | Stop reason: HOSPADM

## 2023-11-02 RX ORDER — MISOPROSTOL 200 UG/1
800 TABLET ORAL AS NEEDED
Status: DISCONTINUED | OUTPATIENT
Start: 2023-11-02 | End: 2023-11-04 | Stop reason: HOSPADM

## 2023-11-02 RX ORDER — METHYLERGONOVINE MALEATE 0.2 MG/ML
200 INJECTION INTRAVENOUS ONCE AS NEEDED
Status: DISCONTINUED | OUTPATIENT
Start: 2023-11-02 | End: 2023-11-04 | Stop reason: HOSPADM

## 2023-11-02 RX ORDER — FAMOTIDINE 10 MG/ML
INJECTION, SOLUTION INTRAVENOUS AS NEEDED
Status: DISCONTINUED | OUTPATIENT
Start: 2023-11-02 | End: 2023-11-02 | Stop reason: SURG

## 2023-11-02 RX ORDER — DOCUSATE SODIUM 100 MG/1
100 CAPSULE, LIQUID FILLED ORAL 2 TIMES DAILY
Status: DISCONTINUED | OUTPATIENT
Start: 2023-11-02 | End: 2023-11-04 | Stop reason: HOSPADM

## 2023-11-02 RX ORDER — CARBOPROST TROMETHAMINE 250 UG/ML
250 INJECTION, SOLUTION INTRAMUSCULAR AS NEEDED
Status: DISCONTINUED | OUTPATIENT
Start: 2023-11-02 | End: 2023-11-04 | Stop reason: HOSPADM

## 2023-11-02 RX ORDER — MISOPROSTOL 200 UG/1
TABLET ORAL
Status: DISPENSED
Start: 2023-11-02 | End: 2023-11-02

## 2023-11-02 RX ADMIN — CEFAZOLIN SODIUM 2 G: 2 SOLUTION INTRAVENOUS at 10:00

## 2023-11-02 RX ADMIN — ONDANSETRON 4 MG: 2 INJECTION INTRAMUSCULAR; INTRAVENOUS at 10:10

## 2023-11-02 RX ADMIN — OXYTOCIN-SODIUM CHLORIDE 0.9% IV SOLN 30 UNIT/500ML 250 ML/HR: 30-0.9/5 SOLUTION at 10:49

## 2023-11-02 RX ADMIN — ONDANSETRON HYDROCHLORIDE 4 MG: 4 TABLET, FILM COATED ORAL at 18:03

## 2023-11-02 RX ADMIN — FAMOTIDINE 20 MG: 10 INJECTION INTRAVENOUS at 10:10

## 2023-11-02 RX ADMIN — ACETAMINOPHEN 1000 MG: 500 TABLET ORAL at 15:02

## 2023-11-02 RX ADMIN — ACETAMINOPHEN 1000 MG: 500 TABLET ORAL at 21:04

## 2023-11-02 RX ADMIN — DOCUSATE SODIUM 100 MG: 100 CAPSULE, LIQUID FILLED ORAL at 15:03

## 2023-11-02 RX ADMIN — SODIUM CHLORIDE, POTASSIUM CHLORIDE, SODIUM LACTATE AND CALCIUM CHLORIDE 1000 ML: 600; 310; 30; 20 INJECTION, SOLUTION INTRAVENOUS at 08:15

## 2023-11-02 RX ADMIN — KETOROLAC TROMETHAMINE 30 MG: 30 INJECTION, SOLUTION INTRAMUSCULAR; INTRAVENOUS at 11:54

## 2023-11-02 RX ADMIN — KETOROLAC TROMETHAMINE 15 MG: 30 INJECTION, SOLUTION INTRAMUSCULAR; INTRAVENOUS at 18:00

## 2023-11-02 RX ADMIN — PRENATAL VIT W/ FE FUMARATE-FA TAB 27-0.8 MG 1 TABLET: 27-0.8 TAB at 15:02

## 2023-11-02 RX ADMIN — BUPIVACAINE HYDROCHLORIDE 1.6 ML: 7.5 INJECTION, SOLUTION EPIDURAL; RETROBULBAR at 10:19

## 2023-11-02 RX ADMIN — AZITHROMYCIN MONOHYDRATE 500 MG: 500 INJECTION, POWDER, LYOPHILIZED, FOR SOLUTION INTRAVENOUS at 09:13

## 2023-11-02 RX ADMIN — FENTANYL CITRATE 10 MCG: 50 INJECTION INTRAMUSCULAR; INTRAVENOUS at 10:19

## 2023-11-02 RX ADMIN — SODIUM CHLORIDE, POTASSIUM CHLORIDE, SODIUM LACTATE AND CALCIUM CHLORIDE: 600; 310; 30; 20 INJECTION, SOLUTION INTRAVENOUS at 10:03

## 2023-11-02 RX ADMIN — DOCUSATE SODIUM 100 MG: 100 CAPSULE, LIQUID FILLED ORAL at 21:03

## 2023-11-02 RX ADMIN — MORPHINE SULFATE 0.2 MG: 1 INJECTION, SOLUTION EPIDURAL; INTRATHECAL; INTRAVENOUS at 10:19

## 2023-11-02 NOTE — ANESTHESIA PREPROCEDURE EVALUATION
Anesthesia Evaluation     Patient summary reviewed and Nursing notes reviewed   NPO Solid Status: > 8 hours  NPO Liquid Status: > 2 hours           Airway   Mallampati: I  TM distance: >3 FB  Neck ROM: full  No difficulty expected  Dental - normal exam     Pulmonary     breath sounds clear to auscultation  (+) a smoker Former Abstained day of surgery,  Cardiovascular - negative cardio ROS and normal exam  Exercise tolerance: good (4-7 METS)    Rhythm: regular  Rate: normal        Neuro/Psych- negative ROS  GI/Hepatic/Renal/Endo    (+) obesity    Musculoskeletal     Abdominal    Substance History - negative use  (-) drug use     OB/GYN    (+) Pregnant, pregnancy induced hypertension        Other                          Anesthesia Plan    ASA 2     spinal       Anesthetic plan, risks, benefits, and alternatives have been provided, discussed and informed consent has been obtained with: patient and spouse/significant other.    Use of blood products discussed with patient and spouse/significant other  Consented to blood products.    Plan discussed with CRNA.        CODE STATUS:    Level Of Support Discussed With: Patient  Code Status (Patient has no pulse and is not breathing): CPR (Attempt to Resuscitate)  Medical Interventions (Patient has pulse or is breathing): Full Support

## 2023-11-02 NOTE — PLAN OF CARE
Goal Outcome Evaluation:  Plan of Care Reviewed With: patient        Progress: improving  Outcome Evaluation: VSS. Primary  for GHTN/possible pre-E & breech. Output adequate. Dressing clean/dry/intact. Fundus firm midline U/1 with scant rubra. Pt states pain is well controlled with scheduled tylenol and toradol. Breastfeeding well. Bonding well with baby.         Problem: Adult Inpatient Plan of Care  Goal: Plan of Care Review  Outcome: Ongoing, Progressing  Flowsheets (Taken 2023)  Progress: improving  Plan of Care Reviewed With: patient  Outcome Evaluation: VSS. Primary  for GHTN/possible pre-E & breech. Output adequate. Dressing clean/dry/intact. Fundus firm midline U/1 with scant rubra. Pt states pain is well controlled with scheduled tylenol and toradol. Breastfeeding well. Bonding well with baby.  Goal: Patient-Specific Goal (Individualized)  Outcome: Ongoing, Progressing  Flowsheets (Taken 2023)  Individualized Care Needs: Pt likes abdominal binder  Goal: Absence of Hospital-Acquired Illness or Injury  Outcome: Ongoing, Progressing  Intervention: Identify and Manage Fall Risk  Recent Flowsheet Documentation  Taken 2023 09 by Evelyn Bettencourt RN  Safety Promotion/Fall Prevention:   assistive device/personal items within reach   safety round/check completed   room organization consistent  Intervention: Prevent Skin Injury  Recent Flowsheet Documentation  Taken 2023 1145 by Evelyn Bettencourt RN  Body Position: sitting up in bed  Taken 2023 09 by Evelyn Bettencourt RN  Body Position: sitting up in bed  Intervention: Prevent and Manage VTE (Venous Thromboembolism) Risk  Recent Flowsheet Documentation  Taken 2023 1145 by Evelyn Bettencourt RN  VTE Prevention/Management:   bilateral   sequential compression devices on  Taken 2023 09 by Evelyn Bettencourt RN  Activity Management:   ambulated in room   ambulated to bathroom   up ad pop  Intervention: Prevent  Infection  Recent Flowsheet Documentation  Taken 11/2/2023 0902 by Evelyn Bettencourt RN  Infection Prevention:   cohorting utilized   hand hygiene promoted   rest/sleep promoted   visitors restricted/screened  Goal: Optimal Comfort and Wellbeing  Outcome: Ongoing, Progressing  Intervention: Provide Person-Centered Care  Recent Flowsheet Documentation  Taken 11/2/2023 1315 by Evelyn Bettencourt RN  Trust Relationship/Rapport:   care explained   choices provided   questions answered   questions encouraged   thoughts/feelings acknowledged  Taken 11/2/2023 1145 by Evelyn Bettencourt RN  Trust Relationship/Rapport:   care explained   choices provided  Taken 11/2/2023 0902 by Evelyn Bettencourt RN  Trust Relationship/Rapport:   care explained   choices provided   emotional support provided   empathic listening provided   questions answered   questions encouraged   reassurance provided   thoughts/feelings acknowledged  Goal: Readiness for Transition of Care  Outcome: Ongoing, Progressing  Intervention: Mutually Develop Transition Plan  Recent Flowsheet Documentation  Taken 11/2/2023 0901 by Evelyn Bettencourt RN  Equipment Currently Used at Home: none  Taken 11/2/2023 0857 by Evelyn Bettencourt RN  Transportation Anticipated: family or friend will provide  Patient/Family Anticipated Services at Transition: none  Patient/Family Anticipates Transition to:   home   home with family     Problem: Skin Injury Risk Increased  Goal: Skin Health and Integrity  Outcome: Ongoing, Progressing  Intervention: Optimize Skin Protection  Recent Flowsheet Documentation  Taken 11/2/2023 0902 by Evelyn Bettencourt RN  Head of Bed (HOB) Positioning: HOB at 45 degrees

## 2023-11-02 NOTE — H&P
PREOPERATIVE HISTORY AND PHYSICAL      Patient Care Team:  System, Provider Not In as PCP - Yuni Smith APRN as Nurse Practitioner (Obstetrics and Gynecology)    Chief complaint: Breech presentation    Pt is a 23 y.o.   Patient's last menstrual period was 2023.     HPI:History of Present Illness  Pt here for C/S for breech presentation @ 38w0d.  Pt is being sectioned today due to GHTN with proteinuria v. Preeclampsia without severe features.   Bedside u/s confirmed breech presentation.       PMHx:   Past Medical History:   Diagnosis Date    Gestational hypertension        Current problem list:    Breech presentation    Maternal varicella, non-immune    Obesity (BMI 30-39.9)    Iron deficiency anemia    GBS (group B Streptococcus carrier), +RV culture, currently pregnant    Proteinuria affecting pregnancy, antepartum    Gestational hypertension, third trimester       PSHx: History reviewed. No pertinent surgical history.    Social Hx:   Social History     Socioeconomic History    Marital status:    Tobacco Use    Smoking status: Former    Smokeless tobacco: Former   Vaping Use    Vaping Use: Former   Substance and Sexual Activity    Alcohol use: Not Currently    Drug use: Never    Sexual activity: Yes     Partners: Male       FHx:   Family History   Problem Relation Age of Onset    Diabetes Father     Diabetes Paternal Grandmother     Diabetes Maternal Grandmother     Heart attack Maternal Grandfather     Diabetes Paternal Uncle     Breast cancer Neg Hx     Uterine cancer Neg Hx     Ovarian cancer Neg Hx     Colon cancer Neg Hx        Debilities/Disabilities Identified: None    Emotional Behavior: Appropriate    PGyn Hx:  otherwise noncontributory    POBHx:   OB History    Para Term  AB Living   1 0 0 0 0 0   SAB IAB Ectopic Molar Multiple Live Births   0 0 0 0 0 0      # Outcome Date GA Lbr Pedro/2nd Weight Sex Delivery Anes PTL Lv   1 Current                Allergies:  Sulfa antibiotics    Medications:   Medications Prior to Admission   Medication Sig Dispense Refill Last Dose    doxylamine (UNISOM) 25 MG tablet Take 1 tablet by mouth Every Night. 30 tablet 1 11/1/2023    ondansetron ODT (ZOFRAN-ODT) 4 MG disintegrating tablet Place 1 tablet on the tongue Every 8 (Eight) Hours As Needed for Nausea or Vomiting. 20 tablet 1 Past Month    Prenatal Vit-Fe Fumarate-FA (prenatal vitamin 27-0.8) 27-0.8 MG tablet tablet Take  by mouth Daily.   11/1/2023    vitamin B-6 (PYRIDOXINE) 25 MG tablet Take 1 tablet by mouth Daily. 30 tablet 1 11/1/2023    fexofenadine (ALLEGRA) 180 MG tablet Take 1 tablet by mouth Daily. [employer] brand   More than a month    promethazine (PHENERGAN) 12.5 MG tablet Take 1 tablet by mouth Every 6 (Six) Hours As Needed for Nausea or Vomiting. 30 tablet 1                               Current Facility-Administered Medications:     acetaminophen (TYLENOL) tablet 1,000 mg, 1,000 mg, Oral, Once, Franky Sloan MD    azithromycin (ZITHROMAX) 500 mg in sodium chloride 0.9 % 250 mL IVPB-VTB, 500 mg, Intravenous, Once, Franky Sloan MD, 500 mg at 11/02/23 0913    ceFAZolin Sodium-Dextrose (ANCEF) IVPB (duplex) 2 g, 2 g, Intravenous, Once, Franky Sloan MD    lactated ringers infusion, 125 mL/hr, Intravenous, Continuous, Franky Sloan MD    lidocaine (XYLOCAINE) 1 % injection 0.5 mL, 0.5 mL, Intradermal, Once PRN, Franky Sloan MD    miSOPROStol (CYTOTEC) 200 MCG tablet  - ADS Override Pull, , , ,     Oxytocin-Sodium Chloride (PITOCIN) 30-0.9 UT/500ML-% infusion  - ADS Override Pull, , , ,     sodium chloride 0.9 % flush 10 mL, 10 mL, Intravenous, Q12H, Franky Sloan MD    sodium chloride 0.9 % flush 10 mL, 10 mL, Intravenous, PRN, Franky Sloan MD    sodium chloride 0.9 % infusion 40 mL, 40 mL, Intravenous, PRN, Franky Sloan MD        Review of Systems  "  Constitutional: Negative.    HENT: Negative.     Eyes: Negative.    Respiratory: Negative.     Cardiovascular: Negative.    Gastrointestinal: Negative.    Endocrine: Negative.    Musculoskeletal: Negative.    Skin: Negative.    Allergic/Immunologic: Negative.    Neurological: Negative.    Hematological: Negative.    Psychiatric/Behavioral: Negative.         Vital Signs  /86   Pulse 75   Temp 98.4 °F (36.9 °C) (Oral)   Resp 18   Ht 180.3 cm (71\")   Wt 117 kg (258 lb)   LMP 2023   Breastfeeding Yes   BMI 35.98 kg/m²     Physical Exam  Vitals and nursing note reviewed.   Constitutional:       Appearance: She is well-developed.   HENT:      Head: Normocephalic and atraumatic.   Cardiovascular:      Rate and Rhythm: Normal rate.   Pulmonary:      Effort: Pulmonary effort is normal.   Abdominal:      General: There is no distension.      Palpations: Abdomen is soft. There is no mass.      Tenderness: There is no abdominal tenderness. There is no guarding.   Genitourinary:     Vagina: No vaginal discharge.   Musculoskeletal:         General: No tenderness or deformity. Normal range of motion.      Cervical back: Normal range of motion.   Skin:     General: Skin is warm and dry.      Coloration: Skin is not pale.      Findings: No erythema or rash.   Neurological:      Mental Status: She is alert and oriented to person, place, and time.   Psychiatric:         Behavior: Behavior normal.         Thought Content: Thought content normal.         Judgment: Judgment normal.             IMPRESSION:    Breech presentation w/ Gestational hypertension @ 38w0d                                    PLAN:    Procedure(s):   SECTION PRIMARY    RISKS, ALTERNATIVES, COMPLICATIONS OF THE PROCEDURE INCLUDING BUT NOT LIMITED TO:    INTRAOPERATIVE RISKS: INJURY TO INTERNAL AND ADJACENT ORGANS AND STRUCTURES (BOWEL, BLADDER, URETER,BLOOD VESSELS) OR HEMORRHAGE REQUIRING FURTHER SURGERY (LAPAROTOMY),  POSSIBLE " NON-DIAGNOSTIC FINDINGS, DISCOVERY OF POSSIBLE MALIGNANCY, INFECTION, AND DEATH;   POSTOP COMPLICATIONS: BLEEDING, INFECTION (REQUIRING POSSIBLE REOPERATION), FAILURE OF GOAL OF SURGERY AND RECURRENCE OF ORIGINAL SYMPTOMS, PNEUMONIA, PULMONARY EMBOLISM, AND DEATH;  WERE EXPLAINED TO THE PT WHO VERBALIZED HER UNDERSTANDING.             I discussed the patients findings and my recommendations with patient and family.     Franky Sloan MD  11/02/23  10:05 EDT

## 2023-11-02 NOTE — ANESTHESIA POSTPROCEDURE EVALUATION
Patient: Giuliana Pan    Procedure Summary       Date: 23 Room / Location: [facility] LABOR DELIVERY   [facility] LABOR DELIVERY    Anesthesia Start: 1004 Anesthesia Stop: 113    Procedure:  SECTION PRIMARY (Abdomen) Diagnosis:       Spontaneous breech delivery, single or unspecified fetus      (Spontaneous breech delivery, single or unspecified fetus [O32.1XX0])    Surgeons: Franky Sloan MD Provider: Debbi Taylor CRNA    Anesthesia Type: spinal ASA Status: 2            Anesthesia Type: spinal    Vitals  Vitals Value Taken Time   /91 23 1200   Temp 97.8 °F (36.6 °C) 23 1145   Pulse 82 23 1200   Resp 19 23 1200   SpO2 97 % 23 1200           Post Anesthesia Care and Evaluation    Patient location during evaluation: bedside  Patient participation: complete - patient participated  Level of consciousness: awake  Pain score: 0  Pain management: adequate    Airway patency: patent  Anesthetic complications: No anesthetic complications  PONV Status: none  Cardiovascular status: acceptable  Respiratory status: acceptable  Hydration status: acceptable  Post Neuraxial Block status: No signs or symptoms of PDPH

## 2023-11-02 NOTE — L&D DELIVERY NOTE
[facility]    Delivery Note    Patient Name: Giuliana Pan  : 2000  MRN: [id number]    Date of Delivery: 2023     Diagnosis     Pre & Post-Delivery:  Intrauterine pregnancy at 38w0d  Labor status:      Breech presentation    Maternal varicella, non-immune    Obesity (BMI 30-39.9)    Iron deficiency anemia    GBS (group B Streptococcus carrier), +RV culture, currently pregnant    Proteinuria affecting pregnancy, antepartum    Gestational hypertension, third trimester             Problem List    Transfer to Postpartum     Review the Delivery Report for details.     Delivery     Delivery: , Low Transverse     YOB: 2025    Time of Birth:  Gestational Age 10:47 AM   38w0d     Anesthesia: Spinal     Delivering clinician: Franky Gutierrezing    Forceps?   No   Vacuum? No    Shoulder dystocia present: No        Delivery narrative:    OPERATIVE REPORT 23     PROCEDURE: Primary Low Transverse  SECTION     PREOP DIAGNOSIS:  breech IUP @ 38w0d, gestational hypertension with proteinuria v. Eclampsia without severe features.      POSTOP DIAGNOSIS:  same     SURGEON:   Nathalia     ASSIST:  Meek, responsible for retracting, suturing, delivery of fetus, closing and placing dressing.     ANESTHESIA:  spinal     EBL:   1000cc     IVFS:  900cc     UO:  400cc     ANTIBIOTICS:  kefzol & zithromax     COMPLICATIONS:  none     FINDINGS:  1 live, viable male, Apgars: pending, wt = pending @ 10:57.                       DESCRIPTION OF THE PROCEDURE:       The patient was taken to the OR and placed on the table in the dorsosupine position.  Adequate spinal anesthesia was ensured.      Pt was prepped and draped.  IV antibiotics were given, time out was done.     A Pfannenstiel incision was made with a knife and carried down sharply till the fascia was encountered.  The fascia was scored in the midline and extended bilaterally.  The fascia was then dissected bluntly and  sharply off the rectus muscle bellies in a superior and inferior direction. The muscles were divided in the midline and the preperitoneal tissue was picked up with hemostats, incised, the peritoneal cavity thus being entered.  This opening was extended bluntly.     A low transverse incision was made with a knife without developing the bladder flap and the amniotic cavity was entered.  There was clear amniotic fluid.  This opening was extended bluntly superiorly and inferiorly.     Pt was delivered of 1 live viable male from the breech position.  There was a loose body and a nuchal cord.  Infant was completely delivered, bulb suctioned, cord doubly clamped and divided and infant handed to nurse in attendance.  Cord blood was drawn, placenta delivered manually, intact w/ 3VC and was sent to pathology.     The uterus was exteriorized out of the abdominal cavity and wiped clean with a lap.  The uterine incision was reapproximated with 0 Monocryl in a running, interlocking stitch till completely hemostatic.  Any bleeders were bovied or oversewn.  The uterus was returned to the abdominal cavity and it was irrigated with copious amounts of warm water.  The uterine incision was reinspected and found to be completely hemostatic.     Both tubes and ovaries were normal, and the rest of the abdominal cavity was palpably normal.  All instruments were removed. Before each level of closure, copious irrigation was carried out and hemostasis was ensured.      The urine was clear at the termination of the procedure.      The fascia was closed with 0 PDS in a running stitch. The subcutaneous layer was closed by the assistant as was the skin.       Pt tolerated procedure well and went to the  in satis condition.  All sponge, instrument and needle counts were correct x 3 according to the operating room personnel.           Infant     Findings: male  infant     Infant observations: Weight: No birth weight on file.   Length:    "in  Observations/Comments:        Apgars:   @ 1 minute /      @ 5 minutes   Infant Name:      Placenta & Cord         Placenta delivered  Manual removal  at   11/2/2023 10:48 AM     Cord:   present.   Nuchal Cord?  yes; Number of nuchal loops present:  1    Cord blood obtained: Yes    Cord gases obtained:  No    Cord gas results: Venous:  No results found for: \"PHCVEN\", \"BECVEN\"    Arterial:  No results found for: \"PHCART\", \"BECART\"     Repair     Episiotomy: None     No    Lacerations: No   Estimated Blood Loss:  1000cc     Quantitative Blood Loss:          Complications     hypertension    Disposition     Mother to Mother Baby/Postpartum  in stable condition currently.  Baby to NBN  in stable condition currently.    Franky Sloan MD  11/02/23  11:27 EDT        "

## 2023-11-02 NOTE — OP NOTE
OPERATIVE REPORT 23    PROCEDURE: Primary Low Transverse  SECTION    PREOP DIAGNOSIS:  breech IUP @ 38w0d, gestational hypertension with proteinuria v. Eclampsia without severe features.     POSTOP DIAGNOSIS:  same    SURGEON:   Nathalia    ASSIST:  Meek, responsible for retracting, suturing, delivery of fetus, closing and placing dressing.    ANESTHESIA:  spinal    EBL:   1000cc    IVFS:  900cc    UO:  400cc    ANTIBIOTICS:  kefzol & zithromax    COMPLICATIONS:  none    FINDINGS:  1 live, viable male, Apgars: pending, wt = pending @ 10:57.                DESCRIPTION OF THE PROCEDURE:      The patient was taken to the OR and placed on the table in the dorsosupine position.  Adequate spinal anesthesia was ensured.     Pt was prepped and draped.  IV antibiotics were given, time out was done.    A Pfannenstiel incision was made with a knife and carried down sharply till the fascia was encountered.  The fascia was scored in the midline and extended bilaterally.  The fascia was then dissected bluntly and sharply off the rectus muscle bellies in a superior and inferior direction. The muscles were divided in the midline and the preperitoneal tissue was picked up with hemostats, incised, the peritoneal cavity thus being entered.  This opening was extended bluntly.    A low transverse incision was made with a knife without developing the bladder flap and the amniotic cavity was entered.  There was clear amniotic fluid.  This opening was extended bluntly superiorly and inferiorly.    Pt was delivered of 1 live viable male from the breech position.  There was a loose body and a nuchal cord.  Infant was completely delivered, bulb suctioned, cord doubly clamped and divided and infant handed to nurse in attendance.  Cord blood was drawn, placenta delivered manually, intact w/ 3VC and was sent to pathology.    The uterus was exteriorized out of the abdominal cavity and wiped clean with a lap.  The uterine incision  was reapproximated with 0 Monocryl in a running, interlocking stitch till completely hemostatic.  Any bleeders were bovied or oversewn.  The uterus was returned to the abdominal cavity and it was irrigated with copious amounts of warm water.  The uterine incision was reinspected and found to be completely hemostatic.    Both tubes and ovaries were normal, and the rest of the abdominal cavity was palpably normal.  All instruments were removed. Before each level of closure, copious irrigation was carried out and hemostasis was ensured.     The urine was clear at the termination of the procedure.     The fascia was closed with 0 PDS in a running stitch. The subcutaneous layer was closed by the assistant as was the skin.      Pt tolerated procedure well and went to the RR in satis condition.  All sponge, instrument and needle counts were correct x 3 according to the operating room personnel.      Franky Sloan MD  11:23 EDT  11/02/23

## 2023-11-02 NOTE — ANESTHESIA PROCEDURE NOTES
Spinal Block      Patient reassessed immediately prior to procedure    Patient location during procedure: OB  Start Time: 11/2/2023 10:10 AM  Stop Time: 11/2/2023 10:20 AM  Indication:at surgeon's request and post-op pain management  Performed By  CRNA/CAA: Dann Hoang CRNA  Preanesthetic Checklist  Completed: patient identified, IV checked, site marked, risks and benefits discussed, surgical consent, monitors and equipment checked, pre-op evaluation and timeout performed  Spinal Block Prep:  Patient Position:sitting  Sterile Tech:cap, mask, sterile barriers and gloves  Prep:Betadine  Patient Monitoring:blood pressure monitoring and continuous pulse oximetry    Spinal Block Procedure  Approach:midline  Guidance:landmark technique  Location:L2-L3  Needle Type:Sprotte  Needle Gauge:25 G  Placement of Spinal needle event:cerebrospinal fluid aspirated  Paresthesia: no  Fluid Appearance:clear  Medications: bupivacaine PF (MARCAINE) injection 0.75% - Intrathecal   1.6 mL - 11/2/2023 10:19:00 AM   Post Assessment  Patient Tolerance:patient tolerated the procedure well with no apparent complications  Complications no

## 2023-11-03 LAB
BACTERIA SPEC AEROBE CULT: NO GROWTH
BASOPHILS # BLD AUTO: 0.04 10*3/MM3 (ref 0–0.2)
BASOPHILS NFR BLD AUTO: 0.4 % (ref 0–1.5)
DEPRECATED RDW RBC AUTO: 48.2 FL (ref 37–54)
EOSINOPHIL # BLD AUTO: 0.13 10*3/MM3 (ref 0–0.4)
EOSINOPHIL NFR BLD AUTO: 1.3 % (ref 0.3–6.2)
ERYTHROCYTE [DISTWIDTH] IN BLOOD BY AUTOMATED COUNT: 15.5 % (ref 12.3–15.4)
HCT VFR BLD AUTO: 34.9 % (ref 34–46.6)
HGB BLD-MCNC: 10.7 G/DL (ref 12–15.9)
IMM GRANULOCYTES # BLD AUTO: 0.04 10*3/MM3 (ref 0–0.05)
IMM GRANULOCYTES NFR BLD AUTO: 0.4 % (ref 0–0.5)
LYMPHOCYTES # BLD AUTO: 1.97 10*3/MM3 (ref 0.7–3.1)
LYMPHOCYTES NFR BLD AUTO: 19.3 % (ref 19.6–45.3)
MCH RBC QN AUTO: 26.6 PG (ref 26.6–33)
MCHC RBC AUTO-ENTMCNC: 30.7 G/DL (ref 31.5–35.7)
MCV RBC AUTO: 86.6 FL (ref 79–97)
MONOCYTES # BLD AUTO: 0.27 10*3/MM3 (ref 0.1–0.9)
MONOCYTES NFR BLD AUTO: 2.6 % (ref 5–12)
NEUTROPHILS NFR BLD AUTO: 7.74 10*3/MM3 (ref 1.7–7)
NEUTROPHILS NFR BLD AUTO: 76 % (ref 42.7–76)
NRBC BLD AUTO-RTO: 0 /100 WBC (ref 0–0.2)
PLATELET # BLD AUTO: 255 10*3/MM3 (ref 140–450)
PMV BLD AUTO: 11 FL (ref 6–12)
RBC # BLD AUTO: 4.03 10*6/MM3 (ref 3.77–5.28)
WBC NRBC COR # BLD: 10.19 10*3/MM3 (ref 3.4–10.8)

## 2023-11-03 PROCEDURE — 0503F POSTPARTUM CARE VISIT: CPT | Performed by: NURSE PRACTITIONER

## 2023-11-03 PROCEDURE — 25010000002 KETOROLAC TROMETHAMINE PER 15 MG: Performed by: OBSTETRICS & GYNECOLOGY

## 2023-11-03 PROCEDURE — 85025 COMPLETE CBC W/AUTO DIFF WBC: CPT | Performed by: OBSTETRICS & GYNECOLOGY

## 2023-11-03 RX ADMIN — KETOROLAC TROMETHAMINE 15 MG: 30 INJECTION, SOLUTION INTRAMUSCULAR; INTRAVENOUS at 06:08

## 2023-11-03 RX ADMIN — ACETAMINOPHEN 650 MG: 325 TABLET ORAL at 22:34

## 2023-11-03 RX ADMIN — ACETAMINOPHEN 1000 MG: 500 TABLET ORAL at 03:01

## 2023-11-03 RX ADMIN — KETOROLAC TROMETHAMINE 15 MG: 30 INJECTION, SOLUTION INTRAMUSCULAR; INTRAVENOUS at 12:40

## 2023-11-03 RX ADMIN — PRENATAL VIT W/ FE FUMARATE-FA TAB 27-0.8 MG 1 TABLET: 27-0.8 TAB at 08:46

## 2023-11-03 RX ADMIN — DOCUSATE SODIUM 100 MG: 100 CAPSULE, LIQUID FILLED ORAL at 22:34

## 2023-11-03 RX ADMIN — ACETAMINOPHEN 1000 MG: 500 TABLET ORAL at 08:46

## 2023-11-03 RX ADMIN — ACETAMINOPHEN 650 MG: 325 TABLET ORAL at 15:03

## 2023-11-03 RX ADMIN — KETOROLAC TROMETHAMINE 15 MG: 30 INJECTION, SOLUTION INTRAMUSCULAR; INTRAVENOUS at 00:42

## 2023-11-03 RX ADMIN — DOCUSATE SODIUM 100 MG: 100 CAPSULE, LIQUID FILLED ORAL at 08:46

## 2023-11-03 RX ADMIN — IBUPROFEN 600 MG: 600 TABLET, FILM COATED ORAL at 18:33

## 2023-11-03 NOTE — PLAN OF CARE
Goal Outcome Evaluation:  Plan of Care Reviewed With: patient        Progress: improving  Outcome Evaluation: VSS. No s&sx of preeclampsia this shift. Tegaderm w/ telfa dressing c/d/i. Fundus firm midline with scant rubra.  Pt states pain is well controlled with toradol and tylenol. Breast and bottle feeding. Bonding well with baby.         Problem: Adult Inpatient Plan of Care  Goal: Plan of Care Review  Outcome: Ongoing, Progressing  Flowsheets (Taken 11/3/2023 1756)  Progress: improving  Plan of Care Reviewed With: patient  Outcome Evaluation: VSS. No s&sx of preeclampsia this shift. Tegaderm w/ telfa dressing c/d/i. Fundus firm midline with scant rubra.  Pt states pain is well controlled with toradol and tylenol. Breast and bottle feeding. Bonding well with baby.  Goal: Patient-Specific Goal (Individualized)  Outcome: Ongoing, Progressing  Flowsheets (Taken 11/2/2023 1818)  Individualized Care Needs: Pt likes abdominal binder  Goal: Absence of Hospital-Acquired Illness or Injury  Outcome: Ongoing, Progressing  Intervention: Prevent and Manage VTE (Venous Thromboembolism) Risk  Recent Flowsheet Documentation  Taken 11/3/2023 1503 by Evelyn Bettencourt RN  Activity Management: up ad pop  Taken 11/3/2023 0846 by Evelyn Bettencourt RN  Activity Management: up ad pop  Goal: Optimal Comfort and Wellbeing  Outcome: Ongoing, Progressing  Intervention: Monitor Pain and Promote Comfort  Recent Flowsheet Documentation  Taken 11/3/2023 1503 by Evelyn Bettencourt RN  Pain Management Interventions: see MAR  Taken 11/3/2023 0846 by Evelyn Bettencourt RN  Pain Management Interventions: see MAR  Intervention: Provide Person-Centered Care  Recent Flowsheet Documentation  Taken 11/3/2023 0846 by Evelyn Bettencourt RN  Trust Relationship/Rapport:   care explained   choices provided  Goal: Readiness for Transition of Care  Outcome: Ongoing, Progressing     Problem: Skin Injury Risk Increased  Goal: Skin Health and Integrity  Outcome: Ongoing,  Progressing

## 2023-11-03 NOTE — PAYOR COMM NOTE
"Giuliana Pan (23 y.o. Female)       Inpt auth Request           Date of Birth   2000    Social Security Number       Address   [address] [place] [address] [address] [place] [address]    Home Phone   [phone]    MRN   [id number]       Spiritism   None    Marital Status                               Admission Date   23    Admission Type   Elective    Admitting Provider   Franky Sloan MD    Attending Provider   Franky Sloan MD    Department, Room/Bed   [facility] [place],        Discharge Date       Discharge Disposition       Discharge Destination                                 Attending Provider: Franky Sloan MD    Allergies: Sulfa Antibiotics    Isolation: None   Infection: None   Code Status: CPR    Ht: 180.3 cm (71\")   Wt: 117 kg (258 lb)    Admission Cmt: None   Principal Problem:  delivery delivered: 23, male [O82]                   Active Insurance as of 2023       Primary Coverage       Payor Plan Insurance Group Employer/Plan Group    ANTHEM BLUE CROSS ANTHEM BLUE CROSS BLUE SHIELD O [id number]       Payor Plan Address Payor Plan Phone Number Payor Plan Fax Number Effective Dates    [address] [phone]  2021 - None Entered    [place] [address]         Subscriber Name Subscriber Birth Date Member ID       GIULIANA PAN 2000 [id number]                     Emergency Contacts        (Rel.) Home Phone Work Phone Mobile Phone    Alexander Ramon (Spouse) -- -- [phone]              [place] Lauren: NPI [id number] Tax ID  [id number]  Insurance Information                  ANTHEM BLUE CROSS/ANTHEM BLUE CROSS BLUE SHIELD PPO Phone: [phone]    Subscriber: VivianeGiuliana ordaz Subscriber#: [id number]    Group#: [id number] Precert#: --             History & Physical        Franky Sloan MD at 23 0843           PREOPERATIVE HISTORY AND PHYSICAL      Patient Care " Team:  System, Provider Not In as PCP - Yuni Smith APRN as Nurse Practitioner (Obstetrics and Gynecology)    Chief complaint: Breech presentation    Pt is a 23 y.o.   Patient's last menstrual period was 2023.     HPI:History of Present Illness  Pt here for C/S for breech presentation @ 38w0d.  Pt is being sectioned today due to GHTN with proteinuria v. Preeclampsia without severe features.   Bedside u/s confirmed breech presentation.       PMHx:   Past Medical History:   Diagnosis Date    Gestational hypertension        Current problem list:    Breech presentation    Maternal varicella, non-immune    Obesity (BMI 30-39.9)    Iron deficiency anemia    GBS (group B Streptococcus carrier), +RV culture, currently pregnant    Proteinuria affecting pregnancy, antepartum    Gestational hypertension, third trimester       PSHx: History reviewed. No pertinent surgical history.    Social Hx:   Social History     Socioeconomic History    Marital status:    Tobacco Use    Smoking status: Former    Smokeless tobacco: Former   Vaping Use    Vaping Use: Former   Substance and Sexual Activity    Alcohol use: Not Currently    Drug use: Never    Sexual activity: Yes     Partners: Male       FHx:   Family History   Problem Relation Age of Onset    Diabetes Father     Diabetes Paternal Grandmother     Diabetes Maternal Grandmother     Heart attack Maternal Grandfather     Diabetes Paternal Uncle     Breast cancer Neg Hx     Uterine cancer Neg Hx     Ovarian cancer Neg Hx     Colon cancer Neg Hx        Debilities/Disabilities Identified: None    Emotional Behavior: Appropriate    PGyn Hx:  otherwise noncontributory    POBHx:   OB History    Para Term  AB Living   1 0 0 0 0 0   SAB IAB Ectopic Molar Multiple Live Births   0 0 0 0 0 0      # Outcome Date GA Lbr Pedro/2nd Weight Sex Delivery Anes PTL Lv   1 Current                Allergies: Sulfa antibiotics    Medications:   Medications Prior  to Admission   Medication Sig Dispense Refill Last Dose    doxylamine (UNISOM) 25 MG tablet Take 1 tablet by mouth Every Night. 30 tablet 1 11/1/2023    ondansetron ODT (ZOFRAN-ODT) 4 MG disintegrating tablet Place 1 tablet on the tongue Every 8 (Eight) Hours As Needed for Nausea or Vomiting. 20 tablet 1 Past Month    Prenatal Vit-Fe Fumarate-FA (prenatal vitamin 27-0.8) 27-0.8 MG tablet tablet Take  by mouth Daily.   11/1/2023    vitamin B-6 (PYRIDOXINE) 25 MG tablet Take 1 tablet by mouth Daily. 30 tablet 1 11/1/2023    fexofenadine (ALLEGRA) 180 MG tablet Take 1 tablet by mouth Daily. [employer] brand   More than a month    promethazine (PHENERGAN) 12.5 MG tablet Take 1 tablet by mouth Every 6 (Six) Hours As Needed for Nausea or Vomiting. 30 tablet 1                               Current Facility-Administered Medications:     acetaminophen (TYLENOL) tablet 1,000 mg, 1,000 mg, Oral, Once, Franky Sloan MD    azithromycin (ZITHROMAX) 500 mg in sodium chloride 0.9 % 250 mL IVPB-VTB, 500 mg, Intravenous, Once, Franky Sloan MD, 500 mg at 11/02/23 0913    ceFAZolin Sodium-Dextrose (ANCEF) IVPB (duplex) 2 g, 2 g, Intravenous, Once, Franky Sloan MD    lactated ringers infusion, 125 mL/hr, Intravenous, Continuous, Franky Sloan MD    lidocaine (XYLOCAINE) 1 % injection 0.5 mL, 0.5 mL, Intradermal, Once PRN, Franky Sloan MD    miSOPROStol (CYTOTEC) 200 MCG tablet  - ADS Override Pull, , , ,     Oxytocin-Sodium Chloride (PITOCIN) 30-0.9 UT/500ML-% infusion  - ADS Override Pull, , , ,     sodium chloride 0.9 % flush 10 mL, 10 mL, Intravenous, Q12H, Franky Sloan MD    sodium chloride 0.9 % flush 10 mL, 10 mL, Intravenous, PRN, Franky Sloan MD    sodium chloride 0.9 % infusion 40 mL, 40 mL, Intravenous, PRN, Franky Sloan MD        Review of Systems   Constitutional: Negative.    HENT: Negative.     Eyes: Negative.   "  Respiratory: Negative.     Cardiovascular: Negative.    Gastrointestinal: Negative.    Endocrine: Negative.    Musculoskeletal: Negative.    Skin: Negative.    Allergic/Immunologic: Negative.    Neurological: Negative.    Hematological: Negative.    Psychiatric/Behavioral: Negative.         Vital Signs  /86   Pulse 75   Temp 98.4 °F (36.9 °C) (Oral)   Resp 18   Ht 180.3 cm (71\")   Wt 117 kg (258 lb)   LMP 2023   Breastfeeding Yes   BMI 35.98 kg/m²     Physical Exam  Vitals and nursing note reviewed.   Constitutional:       Appearance: She is well-developed.   HENT:      Head: Normocephalic and atraumatic.   Cardiovascular:      Rate and Rhythm: Normal rate.   Pulmonary:      Effort: Pulmonary effort is normal.   Abdominal:      General: There is no distension.      Palpations: Abdomen is soft. There is no mass.      Tenderness: There is no abdominal tenderness. There is no guarding.   Genitourinary:     Vagina: No vaginal discharge.   Musculoskeletal:         General: No tenderness or deformity. Normal range of motion.      Cervical back: Normal range of motion.   Skin:     General: Skin is warm and dry.      Coloration: Skin is not pale.      Findings: No erythema or rash.   Neurological:      Mental Status: She is alert and oriented to person, place, and time.   Psychiatric:         Behavior: Behavior normal.         Thought Content: Thought content normal.         Judgment: Judgment normal.             IMPRESSION:    Breech presentation w/ Gestational hypertension @ 38w0d                                    PLAN:    Procedure(s):   SECTION PRIMARY    RISKS, ALTERNATIVES, COMPLICATIONS OF THE PROCEDURE INCLUDING BUT NOT LIMITED TO:    INTRAOPERATIVE RISKS: INJURY TO INTERNAL AND ADJACENT ORGANS AND STRUCTURES (BOWEL, BLADDER, URETER,BLOOD VESSELS) OR HEMORRHAGE REQUIRING FURTHER SURGERY (LAPAROTOMY),  POSSIBLE NON-DIAGNOSTIC FINDINGS, DISCOVERY OF POSSIBLE MALIGNANCY, INFECTION, AND " DEATH;   POSTOP COMPLICATIONS: BLEEDING, INFECTION (REQUIRING POSSIBLE REOPERATION), FAILURE OF GOAL OF SURGERY AND RECURRENCE OF ORIGINAL SYMPTOMS, PNEUMONIA, PULMONARY EMBOLISM, AND DEATH;  WERE EXPLAINED TO THE PT WHO VERBALIZED HER UNDERSTANDING.             I discussed the patients findings and my recommendations with patient and family.     Franky Sloan MD  11/02/23  10:05 EDT      Electronically signed by Franky Sloan MD at 11/02/23 1005       Facility-Administered Medications as of 11/3/2023   Medication Dose Route Frequency Provider Last Rate Last Admin    [COMPLETED] acetaminophen (TYLENOL) tablet 1,000 mg  1,000 mg Oral Q6H Franky Sloan MD   1,000 mg at 11/03/23 0846    Followed by    acetaminophen (TYLENOL) tablet 650 mg  650 mg Oral Q6H Franky Sloan MD   650 mg at 11/03/23 1503    [COMPLETED] azithromycin (ZITHROMAX) 500 mg in sodium chloride 0.9 % 250 mL IVPB-VTB  500 mg Intravenous Once Franky Sloan MD   500 mg at 11/02/23 0913    [COMPLETED] betamethasone acetate-betamethasone sodium phosphate (CELESTONE SOLUSPAN) injection 12 mg  12 mg Intramuscular Once Froylan Haque DO   12 mg at 10/25/23 1421    carboprost (HEMABATE) injection 250 mcg  250 mcg Intramuscular PRN Franky Sloan MD        docusate sodium (COLACE) capsule 100 mg  100 mg Oral BID Franky Sloan MD   100 mg at 11/03/23 0846    [COMPLETED] famotidine (PEPCID) 10 MG/ML injection  - ADS Override Pull             [COMPLETED] ketorolac (TORADOL) injection 15 mg  15 mg Intravenous Q6H Franky Sloan MD   15 mg at 11/03/23 1240    Followed by    ibuprofen (ADVIL,MOTRIN) tablet 600 mg  600 mg Oral Q6H Franky Sloan MD        [COMPLETED] ketorolac (TORADOL) injection 30 mg  30 mg Intravenous Once Franky Sloan MD   30 mg at 11/02/23 1154    [COMPLETED] lactated ringers bolus 1,000 mL  1,000 mL  Intravenous Once Franky Sloan MD 4,000 mL/hr at 23 0815 1,000 mL at 23 0815    lactated ringers infusion  125 mL/hr Intravenous Continuous Franky Sloan  mL/hr at 23 1145 125 mL/hr at 23 1145    Measles, Mumps & Rubella Vac (MMR) injection 0.5 mL  0.5 mL Subcutaneous During Hospitalization Franky Sloan MD        methylergonovine (METHERGINE) injection 200 mcg  200 mcg Intramuscular Once PRN Franky Sloan MD        [] miSOPROStol (CYTOTEC) 200 MCG tablet  - ADS Override Pull             miSOPROStol (CYTOTEC) tablet 800 mcg  800 mcg Rectal PRN Franky Sloan MD        ondansetron (ZOFRAN) tablet 4 mg  4 mg Oral Q8H PRN Franky Sloan MD   4 mg at 23 1803    oxyCODONE (ROXICODONE) immediate release tablet 5 mg  5 mg Oral Q4H PRN Franky Sloan MD        Or    oxyCODONE (ROXICODONE) immediate release tablet 10 mg  10 mg Oral Q4H PRN Franky Sloan MD        oxytocin (PITOCIN) 30 units in 0.9% sodium chloride 500 mL (premix)  125 mL/hr Intravenous Continuous PRN Franky Sloan MD        oxytocin (PITOCIN) 30 units in 0.9% sodium chloride 500 mL (premix)  999 mL/hr Intravenous Once Franky Sloan MD        Followed by    [] oxytocin (PITOCIN) 30 units in 0.9% sodium chloride 500 mL (premix)  250 mL/hr Intravenous Continuous Franky Sloan  mL/hr at 23 1145 250 mL/hr at 23 1145    [COMPLETED] Oxytocin-Sodium Chloride (PITOCIN) 30-0.9 UT/500ML-% infusion  - ADS Override Pull      250 mL/hr at 23 1145 250 mL/hr at 23 1145    polyethylene glycol (MIRALAX) packet 17 g  17 g Oral Daily Franky Sloan MD        prenatal vitamin tablet 1 tablet  1 tablet Oral Daily NathaliaFranky MD   1 tablet at 23 0870     Lab Results (last 72 hours)       Procedure Component Value Units  Date/Time    Urine Culture - Urine, Urine, Clean Catch [[id number]]  (Normal) Collected: 11/02/23 0820    Specimen: Urine, Clean Catch Updated: 11/03/23 1352     Urine Culture No growth    CBC & Differential [[id number]]  (Abnormal) Collected: 11/03/23 0613    Specimen: Blood Updated: 11/03/23 0636    Narrative:      The following orders were created for panel order CBC & Differential.  Procedure                               Abnormality         Status                     ---------                               -----------         ------                     CBC Auto Differential[[id number]]        Abnormal            Final result                 Please view results for these tests on the individual orders.    CBC Auto Differential [[id number]]  (Abnormal) Collected: 11/03/23 0613    Specimen: Blood Updated: 11/03/23 0636     WBC 10.19 10*3/mm3      RBC 4.03 10*6/mm3      Hemoglobin 10.7 g/dL      Hematocrit 34.9 %      MCV 86.6 fL      MCH 26.6 pg      MCHC 30.7 g/dL      RDW 15.5 %      RDW-SD 48.2 fl      MPV 11.0 fL      Platelets 255 10*3/mm3      Neutrophil % 76.0 %      Lymphocyte % 19.3 %      Monocyte % 2.6 %      Eosinophil % 1.3 %      Basophil % 0.4 %      Immature Grans % 0.4 %      Neutrophils, Absolute 7.74 10*3/mm3      Lymphocytes, Absolute 1.97 10*3/mm3      Monocytes, Absolute 0.27 10*3/mm3      Eosinophils, Absolute 0.13 10*3/mm3      Basophils, Absolute 0.04 10*3/mm3      Immature Grans, Absolute 0.04 10*3/mm3      nRBC 0.0 /100 WBC     Tissue Pathology Exam [[id number]] Collected: 11/02/23 1217    Specimen: Tissue from Placenta Updated: 11/02/23 1309    Urinalysis, Microscopic Only - Urine, Clean Catch [[id number]]  (Abnormal) Collected: 11/02/23 0820    Specimen: Urine, Clean Catch Updated: 11/02/23 0935     RBC, UA None Seen /HPF      WBC, UA 11-20 /HPF      Bacteria, UA Trace /HPF      Squamous Epithelial Cells, UA 13-20 /HPF      Hyaline Casts, UA None Seen /LPF      Methodology Manual Light  Microscopy    Comprehensive Metabolic Panel [[id number]]  (Abnormal) Collected: 11/02/23 0820    Specimen: Blood Updated: 11/02/23 0910     Glucose 78 mg/dL      BUN 9 mg/dL      Creatinine 0.55 mg/dL      Sodium 131 mmol/L      Potassium 3.5 mmol/L      Chloride 99 mmol/L      CO2 20.9 mmol/L      Calcium 8.6 mg/dL      Total Protein 6.7 g/dL      Albumin 3.3 g/dL      ALT (SGPT) 10 U/L      AST (SGOT) 14 U/L      Alkaline Phosphatase 87 U/L      Total Bilirubin 0.2 mg/dL      Globulin 3.4 gm/dL      A/G Ratio 1.0 g/dL      BUN/Creatinine Ratio 16.4     Anion Gap 11.1 mmol/L      eGFR 130.6 mL/min/1.73     Narrative:      GFR Normal >60  Chronic Kidney Disease <60  Kidney Failure <15      Urinalysis With Culture If Indicated - Urine, Clean Catch [[id number]]  (Abnormal) Collected: 11/02/23 0820    Specimen: Urine, Clean Catch Updated: 11/02/23 0854     Color, UA Yellow     Appearance, UA Clear     pH, UA 7.0     Specific Gravity, UA 1.020     Glucose, UA Negative     Ketones, UA Negative     Bilirubin, UA Negative     Blood, UA Negative     Protein, UA Negative     Leuk Esterase, UA Small (1+)     Nitrite, UA Negative     Urobilinogen, UA 0.2 E.U./dL    Narrative:      In absence of clinical symptoms, the presence of pyuria, bacteria, and/or nitrites on the urinalysis result does not correlate with infection.    Urine Drug Screen - Urine, Clean Catch [[id number]]  (Normal) Collected: 11/02/23 0820    Specimen: Urine, Clean Catch Updated: 11/02/23 0851     THC, Screen, Urine Negative     Phencyclidine (PCP), Urine Negative     Cocaine Screen, Urine Negative     Methamphetamine, Ur Negative     Opiate Screen Negative     Amphetamine Screen, Urine Negative     Benzodiazepine Screen, Urine Negative     Tricyclic Antidepressants Screen Negative     Methadone Screen, Urine Negative     Barbiturates Screen, Urine Negative     Oxycodone Screen, Urine Negative     Propoxyphene Screen Negative     Buprenorphine, Screen, Urine  Negative    Narrative:      Cutoff For Drugs Screened:    Amphetamines               500 ng/ml  Barbiturates               200 ng/ml  Benzodiazepines            150 ng/ml  Cocaine                    150 ng/ml  Methadone                  200 ng/ml  Opiates                    100 ng/ml  Phencyclidine               25 ng/ml  THC                            50 ng/ml  Methamphetamine            500 ng/ml  Tricyclic Antidepressants  300 ng/ml  Oxycodone                  100 ng/ml  Propoxyphene               300 ng/ml  Buprenorphine               10 ng/ml    The normal value for all drugs tested is negative. This report includes unconfirmed screening results, with the cutoff values listed, to be used for medical treatment purposes only.  Unconfirmed results must not be used for non-medical purposes such as employment or legal testing.  Clinical consideration should be applied to any drug of abuse test, particularly when unconfirmed results are used.      CBC (No Diff) [[id number]]  (Abnormal) Collected: 11/02/23 0820    Specimen: Blood Updated: 11/02/23 0837     WBC 11.43 10*3/mm3      RBC 4.39 10*6/mm3      Hemoglobin 11.8 g/dL      Hematocrit 36.8 %      MCV 83.8 fL      MCH 26.9 pg      MCHC 32.1 g/dL      RDW 15.3 %      RDW-SD 46.1 fl      MPV 10.9 fL      Platelets 320 10*3/mm3           Imaging Results (Last 72 Hours)       ** No results found for the last 72 hours. **          ECG/EMG Results (last 72 hours)       ** No results found for the last 72 hours. **          Orders (last 72 hrs)        Start     Ordered    11/03/23 1830  ibuprofen (ADVIL,MOTRIN) tablet 600 mg  Every 6 Hours        See Hyperspace for full Linked Orders Report.    11/02/23 1131    11/03/23 1230  acetaminophen (TYLENOL) tablet 650 mg  Every 6 Hours        See Hyperspace for full Linked Orders Report.    11/02/23 1131    11/03/23 0600  CBC & Differential  Timed         11/02/23 1131    11/03/23 0600  CBC Auto Differential  PROCEDURE ONCE          11/02/23 2202    11/03/23 0000  Remove Abdominal Dressing  Once         11/02/23 1131    11/02/23 1830  ketorolac (TORADOL) injection 15 mg  Every 6 Hours        See Hyperspace for full Linked Orders Report.    11/02/23 1131    11/02/23 1800  Ambulate Patient  2 Times Daily      Comments: After anesthesia wears off.    11/02/23 1131    11/02/23 1245  oxytocin (PITOCIN) 30 units in 0.9% sodium chloride 500 mL (premix)  Continuous        See Hyperspace for full Linked Orders Report.    11/02/23 1139    11/02/23 1230  docusate sodium (COLACE) capsule 100 mg  2 Times Daily         11/02/23 1131    11/02/23 1230  polyethylene glycol (MIRALAX) packet 17 g  Daily         11/02/23 1131    11/02/23 1230  prenatal vitamin tablet 1 tablet  Daily         11/02/23 1131    11/02/23 1230  acetaminophen (TYLENOL) tablet 1,000 mg  Every 6 Hours        See Hyperspace for full Linked Orders Report.    11/02/23 1131    11/02/23 1230  oxytocin (PITOCIN) 30 units in 0.9% sodium chloride 500 mL (premix)  Once        See Hyperspace for full Linked Orders Report.    11/02/23 1139    11/02/23 1230  ketorolac (TORADOL) injection 30 mg  Once         11/02/23 1139    11/02/23 1211  Tissue Pathology Exam  Once         11/02/23 1211    11/02/23 1200  Vital Signs q 4 while awake  Every 4 Hours,   Status:  Canceled      Comments: While the patient is awake.    11/02/23 0802    11/02/23 1200  Respirations  Every Hour      Comments: If respiratory rate is less than 10/min, notify the Anesthesiologist    11/02/23 1139    11/02/23 1200  Vital Signs (Specify Frequency)  Every 4 Hours       11/02/23 1131    11/02/23 1200  I/O  Every 4 Hours      Comments: Call for urine output <120cc/4hr    11/02/23 1131    11/02/23 1200  Incentive spirometry RT  Every Hour       11/02/23 1131    11/02/23 1140  IV Site Care  Every Third Day         11/02/23 1139    11/02/23 1140  Oxygen Therapy- Nasal Cannula; 2 LPM  Continuous         11/02/23 1139    11/02/23 1141   If Respiratory Rate is Less Than 8/Min, See Narcan Order. Notify Anesthesiologist STAT.  Continuous         11/02/23 1139    11/02/23 1140  Blood Pressure and Pulse Every 4 Hours  Continuous         11/02/23 1139    11/02/23 1140  Activity & Removal of Fontana Catheter, Per Obstetrician  Continuous         11/02/23 1139    11/02/23 1140  Notify Anesthesiologist for Any Questions / Problems  Continuous         11/02/23 1139    11/02/23 1140  Notify Anesthesia for Temp Over 101.4F  Continuous         11/02/23 1139    11/02/23 1140  Ambu Bag at Bedside  Continuous         11/02/23 1139    11/02/23 1140  No Narcotics for 12 Hours After Duramorph Administration  Continuous         11/02/23 1139    11/02/23 1140  Notify Physician (specified)  Until Discontinued         11/02/23 1139    11/02/23 1140  Vital Signs Per Hospital Policy  Per Hospital Policy         11/02/23 1139    11/02/23 1140  Strict Bed Rest  Until Discontinued         11/02/23 1139    11/02/23 1140  Fundal & Lochia Check  Per Order Details        Comments: Every 15 Minutes x4, Then Every 30 Minutes x2, Then Every Shift    11/02/23 1139    11/02/23 1140  Diet: Regular/House Diet; Texture: Regular Texture (IDDSI 7); Fluid Consistency: Thin (IDDSI 0)  Diet Effective Now         11/02/23 1139    11/02/23 1139  Fundal & Lochia Check  Every Shift       11/02/23 1139    11/02/23 1139  methylergonovine (METHERGINE) injection 200 mcg  Once As Needed         11/02/23 1139    11/02/23 1139  carboprost (HEMABATE) injection 250 mcg  As Needed         11/02/23 1139    11/02/23 1139  miSOPROStol (CYTOTEC) tablet 800 mcg  As Needed         11/02/23 1139    11/02/23 1131  Code Status and Medical Interventions:  Continuous         11/02/23 1131    11/02/23 1131  Notify Physician  Until Discontinued         11/02/23 1131    11/02/23 1131  Patient May Shower  Once        Comments: After anesthesia wears off and with assistance    11/02/23 1131    11/02/23 1131  Advance Diet As  "Tolerated -Regular  Until Discontinued,   Status:  Canceled         11/02/23 1131    11/02/23 1131  Fundal and Lochia Check  Per Hospital Policy        Comments: Q 30 min x 2, Q 1 hr x 4, Q 4 hrs x 24 hrs, then Q shift.    11/02/23 1131    11/02/23 1131  Turn Cough Deep Breathe  Once         11/02/23 1131    11/02/23 1131  Encourage early intake of PO fluids  Continuous         11/02/23 1131    11/02/23 1131  Ambulate Patient 3-5 times per day (with or without Fontana)  Every Shift       11/02/23 1131    11/02/23 1131  Apply Abdominal Binding Until Discontinued  Until Discontinued         11/02/23 1131    11/02/23 1131  \"If patient tolerates food and liquids after completion of second bag of Pitocin, saline lock IV and discontinue IV fluid infusions.  Once         11/02/23 1131    11/02/23 1131  Breast pump to bed  Once         11/02/23 1131    11/02/23 1131  If indicated -- Please administer RH Immunoglobulin based on results of cord blood evaluation and fetal screen lab tests, pharmacy to dispense  Continuous        Comments: See process instructions for reference range details.    11/02/23 1131    11/02/23 1131  Place Sequential Compression Device  Once         11/02/23 1131    11/02/23 1131  Maintain Sequential Compression Device  Continuous         11/02/23 1131    11/02/23 1130  oxyCODONE (ROXICODONE) immediate release tablet 5 mg  Every 4 Hours PRN        See Hyperspace for full Linked Orders Report.    11/02/23 1131    11/02/23 1130  oxyCODONE (ROXICODONE) immediate release tablet 10 mg  Every 4 Hours PRN        See Hyperspace for full Linked Orders Report.    11/02/23 1131    11/02/23 1130  ondansetron (ZOFRAN) tablet 4 mg  Every 8 Hours PRN         11/02/23 1131    11/02/23 1130  Measles, Mumps & Rubella Vac (MMR) injection 0.5 mL  During Hospitalization         11/02/23 1131    11/02/23 1130  oxytocin (PITOCIN) 30 units in 0.9% sodium chloride 500 mL (premix)  Continuous PRN         11/02/23 1131    " 11/02/23 1041  Continuous Pulse Oximetry  Continuous         11/02/23 1041    11/02/23 1005  famotidine (PEPCID) 10 MG/ML injection  - ADS Override Pull        Note to Pharmacy: Created by cabinet override    11/02/23 1005    11/02/23 0953  Oxytocin-Sodium Chloride (PITOCIN) 30-0.9 UT/500ML-% infusion  - ADS Override Pull        Note to Pharmacy: Created by cabinet override    11/02/23 0953    11/02/23 0953  miSOPROStol (CYTOTEC) 200 MCG tablet  - ADS Override Pull        Note to Pharmacy: Created by cabinet override    11/02/23 0953    11/02/23 0926  ABO RH Specimen Verification  STAT         11/02/23 0925    11/02/23 0900  sodium chloride 0.9 % flush 10 mL  Every 12 Hours Scheduled,   Status:  Discontinued         11/02/23 0802    11/02/23 0900  lactated ringers bolus 1,000 mL  Once         11/02/23 0802    11/02/23 0900  lactated ringers infusion  Continuous         11/02/23 0802    11/02/23 0900  acetaminophen (TYLENOL) tablet 1,000 mg  Once,   Status:  Discontinued         11/02/23 0802    11/02/23 0900  ceFAZolin Sodium-Dextrose (ANCEF) IVPB (duplex) 2 g  Once,   Status:  Discontinued         11/02/23 0802    11/02/23 0900  azithromycin (ZITHROMAX) 500 mg in sodium chloride 0.9 % 250 mL IVPB-VTB  Once         11/02/23 0802    11/02/23 0849  Urinalysis, Microscopic Only - Urine, Clean Catch  Once         11/02/23 0848    11/02/23 0849  Urine Culture - Urine, Urine, Clean Catch  Once         11/02/23 0848    11/02/23 0804  Urinalysis With Culture If Indicated - Urine, Clean Catch  Once         11/02/23 0803    11/02/23 0804  Comprehensive Metabolic Panel  STAT         11/02/23 0804    11/02/23 0803  Admit To Obstetrics Inpatient  Once         11/02/23 0802    11/02/23 0803  Code Status and Medical Interventions:  Continuous,   Status:  Canceled         11/02/23 0802    11/02/23 0803  Obtain informed consent  Once,   Status:  Canceled         11/02/23 0802    11/02/23 0803  Vital Signs Per Hospital Policy  Per  Hospital Policy,   Status:  Canceled         2302    23  Continuous Fetal Monitoring With NST on Admission and Prior to Initiation of Oxytocin.  Per Order Details,   Status:  Canceled        Comments: Continuous Fetal Monitoring With NST on Admission    23  External Uterine Contraction Monitoring  Per Hospital Policy,   Status:  Canceled         2302    23  Notify Physician (specified)  Until Discontinued,   Status:  Canceled         2302    23  Notify physician for tachysystole (per hospital algorithm)  Until Discontinued,   Status:  Canceled         2302    23  Notify physician if membranes ruptured, bleeding greater than 1 pad an hour, fetal heart tone abnormality, and severe pain  Until Discontinued,   Status:  Canceled         2302    23  Initiate Group Beta Strep (GBS) Prophylaxis Protocol, If Criteria Met  Continuous,   Status:  Canceled        Comments: NO TREATMENT RECOMMENDED IF: 1)  Maternal GBS status known negative 2)  Scheduled  birth with intact membranes, not in labor.  3 ) Maternal GBS unknown, no risk factors.   TREAT WITH ANTIBIOTICS IF:  1)  Maternal GBS status is known postive.  2)  Maternal GBS status unknown with these risk factors:  a)  Previous infant affected by GBS infection.  b)  GBS urinary tract infection (UTI) or bacteruria during pregnancy  c)  Unexplained maternal fever in labor (greater than or equal to 100.4F or 38.0C)  d)  Prolonged rupture of the membranes greater than or equal to 18 hours.  e)  Gestational age less than 37 weeks.    23  Insert Indwelling Urinary Catheter  Once,   Status:  Canceled        See [facility] for full Linked Orders Report.    23  Assess Need for Indwelling Urinary Catheter - Follow Removal Protocol  Continuous,   Status:  Canceled        Comments: Indwelling  Urinary Catheter Removal Criteria  Discontinue Indwelling Urinary Catheter Unless One of the Following is Present  Urinary Retention or Obstruction  Chronic Fontana Catheter Use  End of Life  Critical Illness with Strict I/O   Tract or Abdominal Surgery  Stage 3/4 Sacral / Perineal Wound  Required Activity Restriction: Trauma  Required Activity Restriction: Spine Surgery  If Patient is Being Followed by Urology Contact Them PRIOR to Removal  Do Not Remove Indwelling Urinary Catheter Order is Present with a CLINICAL REASON to Maintain the Catheter. Provider is Required to Include a Clinical Reason to Maintain a Urinary Catheter    Chronic Fontana Catheter Use (Present on Admission)  Assess for Continued Need & Document Medical Necessity  If Infection is Suspected, Contact the Provider       See [facility] for full Linked Orders Report.    11/02/23 0802 11/02/23 0803  Abdominal Prep with Clippers  Once,   Status:  Canceled         11/02/23 0802 11/02/23 0803  Chlorhexadine Skin Prep Unless Otherwise Indicated  Once,   Status:  Canceled         11/02/23 0802 11/02/23 0803  SCD (sequential compression devices)  Once,   Status:  Canceled         11/02/23 0802 11/02/23 0803  POC Glucose Once  Once,   Status:  Canceled         11/02/23 0802    11/02/23 0803  Document Gatorade Consumption Prior to Admission (Yes or No)  Once,   Status:  Canceled         11/02/23 0802    11/02/23 0803  NPO Diet NPO Type: Ice Chips  Diet Effective Now,   Status:  Canceled         11/02/23 0802 11/02/23 0803  Inpatient Consult to Anesthesiology  Once,   Status:  Canceled        Specialty:  Anesthesiology  Provider:  (Not yet assigned)    11/02/23 0802    11/02/23 0803  Type & Screen  Once         11/02/23 0802    11/02/23 0803  CBC (No Diff)  STAT         11/02/23 0802 11/02/23 0803  Insert Peripheral IV  Once,   Status:  Canceled         11/02/23 0802 11/02/23 0803  Saline Lock & Maintain IV Access  Continuous,   Status:   Canceled         23 0802    23 0803  Urine Drug Screen - Urine, Clean Catch  Once         23 0803    23 0802  Urinary Catheter Care  Every Shift,   Status:  Canceled      See [facility] for full [facility] Orders Report.    23 0802    23 0802  sodium chloride 0.9 % flush 10 mL  As Needed,   Status:  Discontinued         23 0802    23 0802  sodium chloride 0.9 % infusion 40 mL  As Needed,   Status:  Discontinued         23 0802    23 0802  lidocaine (XYLOCAINE) 1 % injection 0.5 mL  Once As Needed,   Status:  Discontinued         23 0802    Unscheduled  Blood Gas, Arterial -  As Needed       23 1139    Unscheduled  Up with Assistance  As Needed       23 1131    Unscheduled  Chewing Gum  As Needed       23 1131    Unscheduled  Warm compress  As Needed       23 1131    Unscheduled  Apply ace wrap, tight bra, or binder  As Needed       23 1131    Unscheduled  Apply ice packs  As Needed       23 1131                     Operative/Procedure Notes (last 72 hours)        Nathalia, Franky Sierra MD at 23 1039          OPERATIVE REPORT 23    PROCEDURE: Primary Low Transverse  SECTION    PREOP DIAGNOSIS:  breech IUP @ 38w0d, gestational hypertension with proteinuria v. Eclampsia without severe features.     POSTOP DIAGNOSIS:  same    SURGEON:   [employer]    ASSIST:  [place], responsible for retracting, suturing, delivery of fetus, closing and placing dressing.    ANESTHESIA:  spinal    EBL:   1000cc    IVFS:  900cc    UO:  400cc    ANTIBIOTICS:  kefzol & zithromax    COMPLICATIONS:  none    FINDINGS:  1 live, viable male, Apgars: pending, wt = pending @ 10:57.                DESCRIPTION OF THE PROCEDURE:      The patient was taken to the OR and placed on the table in the dorsosupine position.  Adequate spinal anesthesia was ensured.     Pt was prepped and draped.  IV antibiotics were given, time out was  done.    A Pfannenstiel incision was made with a knife and carried down sharply till the fascia was encountered.  The fascia was scored in the midline and extended bilaterally.  The fascia was then dissected bluntly and sharply off the rectus muscle bellies in a superior and inferior direction. The muscles were divided in the midline and the preperitoneal tissue was picked up with hemostats, incised, the peritoneal cavity thus being entered.  This opening was extended bluntly.    A low transverse incision was made with a knife without developing the bladder flap and the amniotic cavity was entered.  There was clear amniotic fluid.  This opening was extended bluntly superiorly and inferiorly.    Pt was delivered of 1 live viable male from the breech position.  There was a loose body and a nuchal cord.  Infant was completely delivered, bulb suctioned, cord doubly clamped and divided and infant handed to nurse in attendance.  Cord blood was drawn, placenta delivered manually, intact w/ 3VC and was sent to pathology.    The uterus was exteriorized out of the abdominal cavity and wiped clean with a lap.  The uterine incision was reapproximated with 0 Monocryl in a running, interlocking stitch till completely hemostatic.  Any bleeders were bovied or oversewn.  The uterus was returned to the abdominal cavity and it was irrigated with copious amounts of warm water.  The uterine incision was reinspected and found to be completely hemostatic.    Both tubes and ovaries were normal, and the rest of the abdominal cavity was palpably normal.  All instruments were removed. Before each level of closure, copious irrigation was carried out and hemostasis was ensured.     The urine was clear at the termination of the procedure.     The fascia was closed with 0 PDS in a running stitch. The subcutaneous layer was closed by the assistant as was the skin.      Pt tolerated procedure well and went to the  in satis condition.  All sponge,  instrument and needle counts were correct x 3 according to the operating room personnel.      Franky Sloan MD  11:23 EDT  23    Electronically signed by Franky Sloan MD at 23 1127       Franky Sloan MD at 23 1127           [facility]    Delivery Note    Patient Name: Giuliana Pan  : 2000  MRN: [id number]    Date of Delivery: 2023     Diagnosis     Pre & Post-Delivery:  Intrauterine pregnancy at 38w0d  Labor status:      Breech presentation    Maternal varicella, non-immune    Obesity (BMI 30-39.9)    Iron deficiency anemia    GBS (group B Streptococcus carrier), +RV culture, currently pregnant    Proteinuria affecting pregnancy, antepartum    Gestational hypertension, third trimester             Problem List    Transfer to Postpartum     Review the Delivery Report for details.     Delivery     Delivery: , Low Transverse     YOB: 2025    Time of Birth:  Gestational Age 10:47 AM   38w0d     Anesthesia: Spinal     Delivering clinician: Franky Sloan    Forceps?   No   Vacuum? No    Shoulder dystocia present: No        Delivery narrative:    OPERATIVE REPORT 23     PROCEDURE: Primary Low Transverse  SECTION     PREOP DIAGNOSIS:  breech IUP @ 38w0d, gestational hypertension with proteinuria v. Eclampsia without severe features.      POSTOP DIAGNOSIS:  same     SURGEON:   Nathalia     ASSIST:  [place], responsible for retracting, suturing, delivery of fetus, closing and placing dressing.     ANESTHESIA:  spinal     EBL:   1000cc     IVFS:  900cc     UO:  400cc     ANTIBIOTICS:  kefzol & zithromax     COMPLICATIONS:  none     FINDINGS:  1 live, viable male, Apgars: pending, wt = pending @ 10:57.                       DESCRIPTION OF THE PROCEDURE:       The patient was taken to the OR and placed on the table in the dorsosupine position.  Adequate spinal anesthesia was ensured.      Pt  was prepped and draped.  IV antibiotics were given, time out was done.     A Pfannenstiel incision was made with a knife and carried down sharply till the fascia was encountered.  The fascia was scored in the midline and extended bilaterally.  The fascia was then dissected bluntly and sharply off the rectus muscle bellies in a superior and inferior direction. The muscles were divided in the midline and the preperitoneal tissue was picked up with hemostats, incised, the peritoneal cavity thus being entered.  This opening was extended bluntly.     A low transverse incision was made with a knife without developing the bladder flap and the amniotic cavity was entered.  There was clear amniotic fluid.  This opening was extended bluntly superiorly and inferiorly.     Pt was delivered of 1 live viable male from the breech position.  There was a loose body and a nuchal cord.  Infant was completely delivered, bulb suctioned, cord doubly clamped and divided and infant handed to nurse in attendance.  Cord blood was drawn, placenta delivered manually, intact w/ 3VC and was sent to pathology.     The uterus was exteriorized out of the abdominal cavity and wiped clean with a lap.  The uterine incision was reapproximated with 0 Monocryl in a running, interlocking stitch till completely hemostatic.  Any bleeders were bovied or oversewn.  The uterus was returned to the abdominal cavity and it was irrigated with copious amounts of warm water.  The uterine incision was reinspected and found to be completely hemostatic.     Both tubes and ovaries were normal, and the rest of the abdominal cavity was palpably normal.  All instruments were removed. Before each level of closure, copious irrigation was carried out and hemostasis was ensured.      The urine was clear at the termination of the procedure.      The fascia was closed with 0 PDS in a running stitch. The subcutaneous layer was closed by the assistant as was the skin.       Pt  "tolerated procedure well and went to the  in satis condition.  All sponge, instrument and needle counts were correct x 3 according to the operating room personnel.           Infant     Findings: male  infant     Infant observations: Weight: No birth weight on file.   Length:   in  Observations/Comments:        Apgars:   @ 1 minute /      @ 5 minutes   Infant Name:      Placenta & Cord         Placenta delivered  Manual removal  at   2023 10:48 AM     Cord:   present.   Nuchal Cord?  yes; Number of nuchal loops present:  1    Cord blood obtained: Yes    Cord gases obtained:  No    Cord gas results: Venous:  No results found for: \"PHCVEN\", \"BECVEN\"    Arterial:  No results found for: \"PHCART\", \"BECART\"     Repair     Episiotomy: None     No    Lacerations: No   Estimated Blood Loss:  1000cc     Quantitative Blood Loss:          Complications     hypertension    Disposition     Mother to Mother Baby/Postpartum  in stable condition currently.  Baby to NBN  in stable condition currently.    Franky Sloan MD  23  11:27 EDT          Electronically signed by Franky Sloan MD at 23 1128          Physician Progress Notes (last 72 hours)        Yuni You APRN at 23 0828       Attestation signed by Franky Sloan MD at 23 1010    I have reviewed this documentation and agree.                  MURPHY Dixon   PROGRESS NOTE    Post-Op Day 2 S/P   Subjective   Subjective  Patient reports:  Pain is well controlled with  IV toradol .  She is up and ambulating. Tolerating diet. Tolerating po -- normal for liquids and normal for solids.  Intake -- c/o of tolerating po solids and tolerating po liquids.   Voiding - without difficulty; flatus reported..  Vaginal bleeding is as much as expected.    Objective    Objective     Vitals: Vital Signs Range for the last 24 hours  Temperature: Temp:  [97.8 °F (36.6 °C)-97.9 °F (36.6 °C)] 97.9 °F (36.6 " °C)   Temp Source: Temp src: Oral   BP: BP: (124-149)/() 124/90   Pulse: Heart Rate:  [68-88] 75   Respirations: Resp:  [14-22] 18   SPO2: SpO2:  [95 %-97 %] 97 %   O2 Amount (l/min):     O2 Devices Device (Oxygen Therapy): room air   Weight: Weight:  [117 kg (258 lb)] 117 kg (258 lb)            Physical Exam    Lungs clear to auscultation bilaterally   Abdomen Soft, fundus firm, bowel sounds present   Incision  Dressing C/D/I    Extremities edema trace and Homans sign is negative, no sign of DVT     I reviewed the patient's new clinical results.    Assessment & Plan         delivery delivered: 23, male    Maternal varicella, non-immune    Obesity (BMI 30-39.9)    Iron deficiency anemia    GBS (group B Streptococcus carrier), +RV culture, currently pregnant    Proteinuria affecting pregnancy, antepartum    Breech presentation    Gestational hypertension, third trimester    Assessment & Plan    Assessment:    Giuliana Pan is Day 1  post-partum  , Low Transverse   .      Plan:   1) Postpartum day #1- S/P PLTCS. Rh +. Hgb 10.7g.dL .    2) Postpartum care- advance.     3) Male infant- Breast and bottle. Declines circ.     4) GHTN- BPs labile. Denies HA, vision changes, or epigastric pain. Continue to monitor.     5) Dispo- Plan home tomorrow or        LOUIE Pace  23  08:28 EDT      Electronically signed by Franky Sloan MD at 23 1010       Consult Notes (last 72 hours)  Notes from 10/31/23 1504 through 23 1504   No notes of this type exist for this encounter.

## 2023-11-03 NOTE — PROGRESS NOTES
Patient: Giuliana Pan  Procedure(s):   SECTION PRIMARY  Anesthesia type: spinal    Patient location: Labor and Delivery  Last vitals:   Vitals:    23 0900   BP: 128/69   Pulse: 87   Resp: 18   Temp: 97.8 °F (36.6 °C)   SpO2: 96%     Level of consciousness: awake, alert, and oriented    Post-anesthesia pain: adequate analgesia  Airway patency: patent  Respiratory: unassisted  Cardiovascular: stable and blood pressure at baseline  Hydration: euvolemic    Anesthetic complications: no

## 2023-11-03 NOTE — PROGRESS NOTES
MURPHY Dixon   PROGRESS NOTE    Post-Op Day 2 S/P   Subjective   Subjective  Patient reports:  Pain is well controlled with  IV toradol .  She is up and ambulating. Tolerating diet. Tolerating po -- normal for liquids and normal for solids.  Intake -- c/o of tolerating po solids and tolerating po liquids.   Voiding - without difficulty; flatus reported..  Vaginal bleeding is as much as expected.    Objective    Objective     Vitals: Vital Signs Range for the last 24 hours  Temperature: Temp:  [97.8 °F (36.6 °C)-97.9 °F (36.6 °C)] 97.9 °F (36.6 °C)   Temp Source: Temp src: Oral   BP: BP: (124-149)/() 124/90   Pulse: Heart Rate:  [68-88] 75   Respirations: Resp:  [14-22] 18   SPO2: SpO2:  [95 %-97 %] 97 %   O2 Amount (l/min):     O2 Devices Device (Oxygen Therapy): room air   Weight: Weight:  [117 kg (258 lb)] 117 kg (258 lb)            Physical Exam    Lungs clear to auscultation bilaterally   Abdomen Soft, fundus firm, bowel sounds present   Incision  Dressing C/D/I    Extremities edema trace and Homans sign is negative, no sign of DVT     I reviewed the patient's new clinical results.    Assessment & Plan         delivery delivered: 23, male    Maternal varicella, non-immune    Obesity (BMI 30-39.9)    Iron deficiency anemia    GBS (group B Streptococcus carrier), +RV culture, currently pregnant    Proteinuria affecting pregnancy, antepartum    Breech presentation    Gestational hypertension, third trimester    Assessment & Plan    Assessment:    Giuliana Pan is Day 1  post-partum  , Low Transverse   .      Plan:   1) Postpartum day #1- S/P PLTCS. Rh +. Hgb 10.7g.dL .    2) Postpartum care- advance.     3) Male infant- Breast and bottle. Declines circ.     4) GHTN- BPs labile. Denies HA, vision changes, or epigastric pain. Continue to monitor.     5) Dispo- Plan home tomorrow or        LOUIE Pace  23  08:28 EDT

## 2023-11-04 VITALS
HEART RATE: 91 BPM | RESPIRATION RATE: 18 BRPM | BODY MASS INDEX: 36.12 KG/M2 | WEIGHT: 258 LBS | HEIGHT: 71 IN | SYSTOLIC BLOOD PRESSURE: 136 MMHG | DIASTOLIC BLOOD PRESSURE: 68 MMHG | OXYGEN SATURATION: 97 % | TEMPERATURE: 98.3 F

## 2023-11-04 RX ORDER — NIFEDIPINE 30 MG/1
30 TABLET, FILM COATED, EXTENDED RELEASE ORAL
Qty: 30 TABLET | Refills: 1 | Status: SHIPPED | OUTPATIENT
Start: 2023-11-05 | End: 2023-11-04 | Stop reason: SDUPTHER

## 2023-11-04 RX ORDER — HYDROCODONE BITARTRATE AND ACETAMINOPHEN 5; 325 MG/1; MG/1
1 TABLET ORAL EVERY 6 HOURS PRN
Qty: 12 TABLET | Refills: 0 | Status: SHIPPED | OUTPATIENT
Start: 2023-11-04 | End: 2023-11-04 | Stop reason: SDUPTHER

## 2023-11-04 RX ORDER — NIFEDIPINE 30 MG/1
30 TABLET, EXTENDED RELEASE ORAL
Status: DISCONTINUED | OUTPATIENT
Start: 2023-11-04 | End: 2023-11-04 | Stop reason: HOSPADM

## 2023-11-04 RX ORDER — NIFEDIPINE 30 MG/1
30 TABLET, FILM COATED, EXTENDED RELEASE ORAL
Qty: 30 TABLET | Refills: 1 | Status: SHIPPED | OUTPATIENT
Start: 2023-11-05

## 2023-11-04 RX ORDER — HYDROCODONE BITARTRATE AND ACETAMINOPHEN 5; 325 MG/1; MG/1
1 TABLET ORAL EVERY 6 HOURS PRN
Qty: 15 TABLET | Refills: 0 | Status: SHIPPED | OUTPATIENT
Start: 2023-11-04

## 2023-11-04 RX ORDER — IBUPROFEN 600 MG/1
600 TABLET ORAL EVERY 6 HOURS
Qty: 50 TABLET | Refills: 1 | Status: SHIPPED | OUTPATIENT
Start: 2023-11-04

## 2023-11-04 RX ORDER — DOCUSATE SODIUM 100 MG/1
100 CAPSULE, LIQUID FILLED ORAL 2 TIMES DAILY
Qty: 60 CAPSULE | Refills: 0 | Status: SHIPPED | OUTPATIENT
Start: 2023-11-04

## 2023-11-04 RX ORDER — DOCUSATE SODIUM 100 MG/1
100 CAPSULE, LIQUID FILLED ORAL 2 TIMES DAILY
Qty: 60 CAPSULE | Refills: 1 | Status: SHIPPED | OUTPATIENT
Start: 2023-11-04 | End: 2023-11-04 | Stop reason: SDUPTHER

## 2023-11-04 RX ORDER — IBUPROFEN 600 MG/1
600 TABLET ORAL EVERY 6 HOURS
Qty: 40 TABLET | Refills: 1 | Status: SHIPPED | OUTPATIENT
Start: 2023-11-04 | End: 2023-11-04 | Stop reason: SDUPTHER

## 2023-11-04 RX ADMIN — NIFEDIPINE 30 MG: 30 TABLET, EXTENDED RELEASE ORAL at 08:32

## 2023-11-04 RX ADMIN — PRENATAL VIT W/ FE FUMARATE-FA TAB 27-0.8 MG 1 TABLET: 27-0.8 TAB at 07:52

## 2023-11-04 RX ADMIN — IBUPROFEN 600 MG: 600 TABLET, FILM COATED ORAL at 06:55

## 2023-11-04 RX ADMIN — DOCUSATE SODIUM 100 MG: 100 CAPSULE, LIQUID FILLED ORAL at 07:52

## 2023-11-04 RX ADMIN — IBUPROFEN 600 MG: 600 TABLET, FILM COATED ORAL at 00:21

## 2023-11-04 RX ADMIN — ACETAMINOPHEN 650 MG: 325 TABLET ORAL at 04:14

## 2023-11-04 RX ADMIN — ACETAMINOPHEN 650 MG: 325 TABLET ORAL at 11:19

## 2023-11-04 NOTE — PLAN OF CARE
Problem: Adult Inpatient Plan of Care  Goal: Plan of Care Review  Outcome: Met  Goal: Patient-Specific Goal (Individualized)  Outcome: Met  Goal: Absence of Hospital-Acquired Illness or Injury  Outcome: Met  Intervention: Identify and Manage Fall Risk  Recent Flowsheet Documentation  Taken 11/4/2023 0804 by Maryellen Collier RN  Safety Promotion/Fall Prevention:   safety round/check completed   room organization consistent   nonskid shoes/slippers when out of bed   clutter free environment maintained   assistive device/personal items within reach  Taken 11/4/2023 0700 by Maryellen Collier RN  Safety Promotion/Fall Prevention: safety round/check completed  Intervention: Prevent Skin Injury  Recent Flowsheet Documentation  Taken 11/4/2023 0804 by Maryellen Collier RN  Body Position: sitting up in bed  Intervention: Prevent and Manage VTE (Venous Thromboembolism) Risk  Recent Flowsheet Documentation  Taken 11/4/2023 0804 by Mrayellen Collier RN  Activity Management: up ad pop  Intervention: Prevent Infection  Recent Flowsheet Documentation  Taken 11/4/2023 0804 by Maryellen Collier RN  Infection Prevention:   cohorting utilized   environmental surveillance performed   equipment surfaces disinfected   hand hygiene promoted   personal protective equipment utilized   rest/sleep promoted   single patient room provided   visitors restricted/screened  Goal: Optimal Comfort and Wellbeing  Outcome: Met  Intervention: Provide Person-Centered Care  Recent Flowsheet Documentation  Taken 11/4/2023 0804 by Maryellen Collier RN  Trust Relationship/Rapport:   care explained   choices provided   emotional support provided   empathic listening provided   questions answered   questions encouraged   reassurance provided   thoughts/feelings acknowledged  Goal: Readiness for Transition of Care  Outcome: Met     Problem: Skin Injury Risk Increased  Goal: Skin Health and Integrity  Outcome: Met  Intervention: Optimize Skin  Protection  Recent Flowsheet Documentation  Taken 11/4/2023 0804 by Maryellen Collier, RN  Head of Bed (HOB) Positioning: HOB at 90 degrees   Goal Outcome Evaluation:

## 2023-11-04 NOTE — PROGRESS NOTES
2023    Name:Giuliana Pan    MR#:[id number]     Progress Note:  Post-Op day 2 S/P    HD:2    Subjective   23 y.o. yo Female  s/p CS at 38w0d doing well. Pain well controlled. Tolerating regular diet and having flatus. Lochia normal. She denies headache, visual changes, upper abd pain. She hopes to go home today.    Patient Active Problem List   Diagnosis    Maternal varicella, non-immune    Obesity (BMI 30-39.9)    Pregnancy    Iron deficiency anemia    GBS (group B Streptococcus carrier), +RV culture, currently pregnant    Proteinuria affecting pregnancy, antepartum    Breech presentation    Gestational hypertension, third trimester     delivery delivered: 23, male        Objective    Vitals  Temp:  Temp:  [97.8 °F (36.6 °C)-98.3 °F (36.8 °C)] 98.3 °F (36.8 °C)  Temp src: Oral  BP:  BP: (127-158)/(69-86) 158/86  Pulse:  Heart Rate:  [79-87] 83  RR:   Resp:  [18-20] 20  Weight: 117 kg (258 lb)  BMI:  Body mass index is 35.98 kg/m².      General Awake, alert, no distress  Abdomen Soft, non-distended, fundus firm, 2 cm below umbilicus, appropriately tender  Incision  Intact, no erythema or exudate  Extremities Calves NT bilaterally   Neuro  Normal patellar reflex bilaterally        I/O last 3 completed shifts:  In: -   Out: 3250 [Urine:3250]    LABS:   Lab Results   Component Value Date    WBC 10.19 2023    HGB 10.7 (L) 2023    HCT 34.9 2023    MCV 86.6 2023     2023       Infant: male       Assessment/Plan   1.  POD 2 CS for breech  2. Preeclampsia- her BP has trended up this morning, but not severe range and asymptomatic. Starting nifedipine 30 XL and will monitor BP through the afternoon. If normotensive may be able to go home today but will need close office follow up for BP check         delivery delivered: 23, male    Maternal varicella, non-immune    Obesity (BMI 30-39.9)    Iron deficiency anemia    GBS (group B  Streptococcus carrier), +RV culture, currently pregnant    Proteinuria affecting pregnancy, antepartum    Breech presentation    Gestational hypertension, third trimester      Dulce Jordan MD  11/4/2023 08:53 EDT

## 2023-11-06 NOTE — CASE MANAGEMENT/SOCIAL WORK
Case Management Discharge Note      Final Note: Discharged home.         Selected Continued Care - Discharged on 11/4/2023 Admission date: 11/2/2023 - Discharge disposition: Home or Self Care      Destination    No services have been selected for the patient.                Durable Medical Equipment    No services have been selected for the patient.                Dialysis/Infusion    No services have been selected for the patient.                Home Medical Care    No services have been selected for the patient.                Therapy    No services have been selected for the patient.                Community Resources    No services have been selected for the patient.                Community & DME    No services have been selected for the patient.                         Final Discharge Disposition Code: 01 - home or self-care

## 2023-11-07 ENCOUNTER — POSTPARTUM VISIT (OUTPATIENT)
Dept: OBSTETRICS AND GYNECOLOGY | Facility: CLINIC | Age: 23
End: 2023-11-07
Payer: COMMERCIAL

## 2023-11-07 VITALS — BODY MASS INDEX: 35.98 KG/M2 | HEIGHT: 71 IN | DIASTOLIC BLOOD PRESSURE: 90 MMHG | SYSTOLIC BLOOD PRESSURE: 160 MMHG

## 2023-11-07 DIAGNOSIS — O12.10 PROTEINURIA AFFECTING PREGNANCY, ANTEPARTUM: ICD-10-CM

## 2023-11-07 DIAGNOSIS — O13.3 GESTATIONAL HYPERTENSION, THIRD TRIMESTER: ICD-10-CM

## 2023-11-07 LAB
LAB AP CASE REPORT: NORMAL
PATH REPORT.FINAL DX SPEC: NORMAL

## 2023-11-07 PROCEDURE — 0503F POSTPARTUM CARE VISIT: CPT | Performed by: OBSTETRICS & GYNECOLOGY

## 2023-11-07 RX ORDER — LABETALOL 100 MG/1
100 TABLET, FILM COATED ORAL 2 TIMES DAILY
Qty: 60 TABLET | Refills: 6 | Status: SHIPPED | OUTPATIENT
Start: 2023-11-07

## 2023-11-07 NOTE — PROGRESS NOTES
"POSTPARTUM BP CHECK    S:  pt has no complaints.     O:  /90   Ht 180.3 cm (71\")   LMP 02/09/2023   BMI 35.98 kg/m²     Brief Urine Lab Results  (Last result in the past 365 days)        Color   Clarity   Blood   Leuk Est   Nitrite   Protein   CREAT   Urine HCG        11/02/23 0820 Yellow   Clear   Negative   Small (1+)   Negative   Negative                   A:  doing well pp.  Bp still elevated    P:  add labetolol.  Rto 1 week.     Franky Sloan MD  12:55 EST  11/07/23      "

## 2023-11-14 ENCOUNTER — POSTPARTUM VISIT (OUTPATIENT)
Dept: OBSTETRICS AND GYNECOLOGY | Facility: CLINIC | Age: 23
End: 2023-11-14
Payer: COMMERCIAL

## 2023-11-14 VITALS
HEIGHT: 71 IN | WEIGHT: 245 LBS | SYSTOLIC BLOOD PRESSURE: 134 MMHG | DIASTOLIC BLOOD PRESSURE: 82 MMHG | BODY MASS INDEX: 34.3 KG/M2

## 2023-11-14 PROBLEM — Z34.90 PREGNANCY: Status: RESOLVED | Noted: 2023-10-24 | Resolved: 2023-11-14

## 2023-11-14 PROCEDURE — 0503F POSTPARTUM CARE VISIT: CPT | Performed by: OBSTETRICS & GYNECOLOGY

## 2023-12-10 NOTE — PROGRESS NOTES
"FINAL POSTPARTUM VISIT: This patient has no babies on file.; gender: female    S:  doing well.  PP depression symptoms or concerns?  no.      Breast or bottle feeding?  breast.      Hx GDM? no, Hx HTN? YES.  If \"yes\" to either, pt counseled that these disorders are associated   With a higher lifetime risk of cardiometabolic disease and that follow up with their PCP for ongoing   Care is vital.     Any shortness of breath or persistent lower extremity swelling?  no    Hx  birth? no    O:  /88   Ht 180.3 cm (71\")   Wt 114 kg (252 lb)   Breastfeeding Yes   BMI 35.15 kg/m²     Exam: inc looks good    A:  doing well pp      * No active hospital problems. *      Smoker? no    P:  RTO for nexplanon    Contraception needed?  Will order nexplanon and call. Lit given.    Pt instructed to incorporate pelvic floor exercises for LARRY.    Franky Sloan MD  09:23 EST  23  "

## 2023-12-12 ENCOUNTER — TELEPHONE (OUTPATIENT)
Dept: OBSTETRICS AND GYNECOLOGY | Facility: CLINIC | Age: 23
End: 2023-12-12

## 2023-12-12 ENCOUNTER — POSTPARTUM VISIT (OUTPATIENT)
Dept: OBSTETRICS AND GYNECOLOGY | Facility: CLINIC | Age: 23
End: 2023-12-12
Payer: COMMERCIAL

## 2023-12-12 VITALS
WEIGHT: 252 LBS | HEIGHT: 71 IN | BODY MASS INDEX: 35.28 KG/M2 | DIASTOLIC BLOOD PRESSURE: 88 MMHG | SYSTOLIC BLOOD PRESSURE: 142 MMHG

## 2023-12-12 DIAGNOSIS — D50.9 IRON DEFICIENCY ANEMIA, UNSPECIFIED IRON DEFICIENCY ANEMIA TYPE: ICD-10-CM

## 2023-12-12 DIAGNOSIS — O13.3 GESTATIONAL HYPERTENSION, THIRD TRIMESTER: ICD-10-CM

## 2023-12-12 LAB
B-HCG UR QL: NEGATIVE
BILIRUB BLD-MCNC: NEGATIVE MG/DL
CLARITY, POC: CLEAR
COLOR UR: YELLOW
EXPIRATION DATE: NORMAL
GLUCOSE UR STRIP-MCNC: NEGATIVE MG/DL
INTERNAL NEGATIVE CONTROL: NORMAL
INTERNAL POSITIVE CONTROL: NORMAL
KETONES UR QL: NEGATIVE
LEUKOCYTE EST, POC: NEGATIVE
Lab: NORMAL
NITRITE UR-MCNC: NEGATIVE MG/ML
PH UR: 5 [PH] (ref 5–8)
PROT UR STRIP-MCNC: NEGATIVE MG/DL
RBC # UR STRIP: ABNORMAL /UL
SP GR UR: 1.03 (ref 1–1.03)
UROBILINOGEN UR QL: NORMAL

## 2023-12-19 ENCOUNTER — OFFICE VISIT (OUTPATIENT)
Dept: FAMILY MEDICINE CLINIC | Facility: CLINIC | Age: 23
End: 2023-12-19
Payer: COMMERCIAL

## 2023-12-19 ENCOUNTER — PATIENT ROUNDING (BHMG ONLY) (OUTPATIENT)
Dept: FAMILY MEDICINE CLINIC | Facility: CLINIC | Age: 23
End: 2023-12-19
Payer: COMMERCIAL

## 2023-12-19 VITALS
HEIGHT: 71 IN | HEART RATE: 81 BPM | OXYGEN SATURATION: 98 % | BODY MASS INDEX: 35.28 KG/M2 | RESPIRATION RATE: 18 BRPM | WEIGHT: 252 LBS | SYSTOLIC BLOOD PRESSURE: 144 MMHG | DIASTOLIC BLOOD PRESSURE: 92 MMHG

## 2023-12-19 DIAGNOSIS — I10 PRIMARY HYPERTENSION: Primary | ICD-10-CM

## 2023-12-19 PROCEDURE — 99203 OFFICE O/P NEW LOW 30 MIN: CPT | Performed by: STUDENT IN AN ORGANIZED HEALTH CARE EDUCATION/TRAINING PROGRAM

## 2023-12-19 RX ORDER — ETONOGESTREL 68 MG/1
1 IMPLANT SUBCUTANEOUS ONCE
COMMUNITY
Start: 2023-12-14 | End: 2023-12-19

## 2023-12-19 RX ORDER — LISINOPRIL 10 MG/1
10 TABLET ORAL DAILY
Qty: 30 TABLET | Refills: 0 | Status: SHIPPED | OUTPATIENT
Start: 2023-12-19

## 2023-12-19 NOTE — PROGRESS NOTES
"    Francisco Evangelista DO  [facility]  [address]  [place] [id number] [phone]    Subjective      Name Giuliana Pan MRN [id number]    2000 AGE/SEX 23 y.o. / female      Chief Complaint Chief Complaint   Patient presents with    Hypertension     New patient here to establish care. States she had high blood pressure the last two weeks of pregnancy and would like to follow up          Visit History for  2023    History of Present Illness  Giuliana Pan is a 23 y.o. female who presented today for Hypertension (New patient here to establish care. States she had high blood pressure the last two weeks of pregnancy and would like to follow up )  .        Medications and Allergies   Current Outpatient Medications   Medication Instructions    fexofenadine (ALLEGRA) 180 mg, Oral, Daily, [employer] brand    labetalol (NORMODYNE) 100 mg, Oral, 2 Times Daily    lisinopril (PRINIVIL,ZESTRIL) 10 mg, Oral, Daily    Prenatal Vit-Fe Fumarate-FA (prenatal vitamin 27-0.8) 27-0.8 MG tablet tablet Oral, Daily     Allergies   Allergen Reactions    Sulfa Antibiotics Hives      I have reviewed the above medications and allergies     Objective:      Vitals Vitals:    23 0830   BP: 144/92   BP Location: Left arm   Patient Position: Sitting   Cuff Size: Adult   Pulse: 81   Resp: 18   SpO2: 98%   Weight: 114 kg (252 lb)   Height: 180.3 cm (71\")     Body mass index is 35.15 kg/m².    Physical Exam  Vitals reviewed.   Constitutional:       General: She is not in acute distress.     Appearance: She is not ill-appearing.   Cardiovascular:      Rate and Rhythm: Normal rate and regular rhythm.   Pulmonary:      Effort: Pulmonary effort is normal.   Psychiatric:         Mood and Affect: Mood normal.         Behavior: Behavior normal.         Thought Content: Thought content normal.         Judgment: Judgment normal.            Assessment/Plan      Issues Addressed/ Plan  1. Primary " hypertension  -Going to try to utilize lisinopril at this time for patient blood pressure.  We discussed the positives and negatives of treatment with this medication.  Unknown risks with breast feeding.  - lisinopril (PRINIVIL,ZESTRIL) 10 MG tablet; Take 1 tablet by mouth Daily.  Dispense: 30 tablet; Refill: 0     There are no Patient Instructions on file for this visit.           Follow up  recommended Return in about 2 weeks (around 1/2/2024).   - Dragon voice recognition software was utilized to complete this chart.  Every reasonable attempt was made to edit and correct the text, however some incorrect words may remain.   Francisco Evangelista, DO

## 2023-12-19 NOTE — PROGRESS NOTES
A [employer] message has been sent to the patient for [facility] [place] [facility]

## 2023-12-21 NOTE — PROGRESS NOTES
Nexplanon Insertion:  Chief Complaint   Patient presents with    Procedure     Nexplanon insertion       Pre Dx: 1) Nexplanon Insertion   Post Dx: 1) Nexplanon Insertion     Procedures    Risks, benefits, alternatives explained. All questions answered. Consents signed.  Patient placed on examined table. Nexplanon to be inserted into nondominant arm. The area for insertion prepped with betadine in a sterile fashion. About 2 mL of 1% lidocaine was infiltrated without incident.    The insertion site was identified approximately 6-8 cm proximal to the elbow crease in the underside of the upper left arm overlying the groove between the biceps and triceps muscles. The skin at the insertion site was stretched by my thumb and index finger. Then inserted the needle tip, bevel side up, at an angle not greater than 20° to the skin surface, just until the skin was penetrated. The applicator was then lowered so that it was parallel to the arm. To minimize the chance of deep incision, the skin was tented upwards with the tip of the needle. The needle was then gently inserted to the full length. Then fixed the device in place on the arm with one hand. I then slowly and carefully retracted the needle cannula back along the length of the device after pushing the release button. The obturator was seen in the device to determine that the nexplanon had been released.     Both the patient and I were easily able to feel the device under the skin. Steri-Strips were applied at the site, and the arm was gently wrapped with gauze.    There were no complications.   The patient tolerated the procedure well.     She was given a reminder card. She was advised to use condoms as a backup method for at least a week after insertion.    Expectations, warning signs and limitations reviewed.     Diagnoses and all orders for this visit:    1. Nexplanon insertion: 12/22/23 L (Primary)  -     POC Pregnancy, Urine        RTO Return in about 1 year (around  12/22/2024) for Annual physical.      Franky Sloan MD    12/22/2023  10:18 EST

## 2023-12-22 ENCOUNTER — OFFICE VISIT (OUTPATIENT)
Dept: OBSTETRICS AND GYNECOLOGY | Facility: CLINIC | Age: 23
End: 2023-12-22
Payer: COMMERCIAL

## 2023-12-22 VITALS
WEIGHT: 248 LBS | HEIGHT: 71 IN | BODY MASS INDEX: 34.72 KG/M2 | DIASTOLIC BLOOD PRESSURE: 78 MMHG | SYSTOLIC BLOOD PRESSURE: 130 MMHG

## 2023-12-22 DIAGNOSIS — Z30.017 NEXPLANON INSERTION: Primary | ICD-10-CM

## 2023-12-22 LAB
B-HCG UR QL: NEGATIVE
EXPIRATION DATE: NORMAL
INTERNAL NEGATIVE CONTROL: NORMAL
INTERNAL POSITIVE CONTROL: NORMAL
Lab: NORMAL

## 2024-01-02 ENCOUNTER — OFFICE VISIT (OUTPATIENT)
Dept: FAMILY MEDICINE CLINIC | Facility: CLINIC | Age: 24
End: 2024-01-02
Payer: COMMERCIAL

## 2024-01-02 VITALS
HEART RATE: 58 BPM | DIASTOLIC BLOOD PRESSURE: 92 MMHG | RESPIRATION RATE: 18 BRPM | BODY MASS INDEX: 34.72 KG/M2 | WEIGHT: 248 LBS | OXYGEN SATURATION: 97 % | SYSTOLIC BLOOD PRESSURE: 134 MMHG | HEIGHT: 71 IN

## 2024-01-02 DIAGNOSIS — K81.9 CHOLECYSTITIS: ICD-10-CM

## 2024-01-02 DIAGNOSIS — I10 PRIMARY HYPERTENSION: Primary | ICD-10-CM

## 2024-01-02 PROCEDURE — 99213 OFFICE O/P EST LOW 20 MIN: CPT | Performed by: STUDENT IN AN ORGANIZED HEALTH CARE EDUCATION/TRAINING PROGRAM

## 2024-01-02 RX ORDER — AMOXICILLIN AND CLAVULANATE POTASSIUM 875; 125 MG/1; MG/1
1 TABLET, FILM COATED ORAL
COMMUNITY
Start: 2023-12-30 | End: 2024-01-06

## 2024-01-02 RX ORDER — ONDANSETRON 4 MG/1
4 TABLET, ORALLY DISINTEGRATING ORAL EVERY 6 HOURS PRN
COMMUNITY
Start: 2023-12-30 | End: 2024-01-29

## 2024-01-02 RX ORDER — OMEPRAZOLE 20 MG/1
20 CAPSULE, DELAYED RELEASE ORAL
COMMUNITY
Start: 2023-12-30

## 2024-01-02 RX ORDER — OXYCODONE HYDROCHLORIDE AND ACETAMINOPHEN 5; 325 MG/1; MG/1
1 TABLET ORAL EVERY 4 HOURS PRN
COMMUNITY
Start: 2023-12-30 | End: 2024-01-02

## 2024-01-02 NOTE — PROGRESS NOTES
"    Francisco Evangelista DO  [facility]  [address] [place] [address] [address]  [place] [address] [phone]    Subjective      Name Giuliana Pan MRN [id number]    2000 AGE/SEX 23 y.o. / female      Chief Complaint Chief Complaint   Patient presents with    Hypertension     2 week follow up, BP has been better          Visit History for  2024    History of Present Illness  Giuliana Pan is a 23 y.o. female who presented today for Hypertension (2 week follow up, BP has been better )  .    Notes improvement with blood pressure since last visit. Happy with the improvement. Reports readings of 132/69 mmHg, 122/63 mmHg, 121/69 mmHg, and 122/75 mmHg while being treated in the hospital for abdominal pain recently.     Scheduled for MRCP scan on 2024 and surgical consultation on 2024, to discuss upcoming cholecystectomy. Sitting causes pain without pain medication. Immediate onset of pain noted on 2023. Denies junk food intake.         Medications and Allergies   Current Outpatient Medications   Medication Instructions    amoxicillin-clavulanate (AUGMENTIN) 875-125 MG per tablet 1 tablet, Oral    fexofenadine (ALLEGRA) 180 mg, Oral, Daily, [employer] brand    lisinopril (PRINIVIL,ZESTRIL) 10 mg, Oral, Daily    omeprazole (PRILOSEC) 20 mg, Oral, Every Morning Before Breakfast    ondansetron ODT (ZOFRAN-ODT) 4 mg, Oral, Every 6 Hours PRN    oxyCODONE-acetaminophen (PERCOCET) 5-325 MG per tablet 1 tablet, Oral, Every 4 Hours PRN    Prenatal Vit-Fe Fumarate-FA (prenatal vitamin 27-0.8) 27-0.8 MG tablet tablet Oral, Daily     Allergies   Allergen Reactions    Sulfa Antibiotics Hives      I have reviewed the above medications and allergies     Objective:      Vitals Vitals:    24 1035   BP: 134/92   BP Location: Left arm   Patient Position: Sitting   Cuff Size: Adult   Pulse: 58   Resp: 18   SpO2: 97%   Weight: 112 kg (248 lb)   Height: 180.3 cm (71\")     Body mass index is 34.59 " kg/m².    Physical Exam  Vitals reviewed.   Constitutional:       General: She is not in acute distress.     Appearance: She is not ill-appearing.   Pulmonary:      Effort: Pulmonary effort is normal.   Psychiatric:         Mood and Affect: Mood normal.         Behavior: Behavior normal.         Thought Content: Thought content normal.         Judgment: Judgment normal.            Assessment/Plan      Issues Addressed/ Plan   Diagnosis Plan   1. Primary hypertension        2. Cholecystitis             1. Hypertension.  - Blood pressure has improved since previous visit. No changes in regimen at this time. Discussed symptoms to be aware of such as lightheadedness with standing from a seated position, or bending over then standing. Advised her to monitor her blood pressure during symptoms in order to re-evaluate at next visit.  Possible mild elevation in BP today due issues with cholecystitis.      2. Cholecystitis.  - Scheduled for MRCP scan on 01/02/2024 and surgical consultation on 01/03/2024. Plan is for cholecystectomy. Discussed diet and bowel habit change post surgery. We will continue to monitor post surgery.         Follow up  recommended Return in about 3 months (around 4/2/2024) for Blood Pressure.   - Dragon voice recognition software was utilized to complete this chart.  Every reasonable attempt was made to edit and correct the text, however some incorrect words may remain.   Francisco Evangelista DO    Transcribed from ambient dictation for Francisco Evangelista DO by Malinda Villeda.  01/02/24   12:21 EST    Patient or patient representative verbalized consent to the visit recording.  I have personally performed the services described in this document as transcribed by the above individual, and it is both accurate and complete.

## 2024-01-16 DIAGNOSIS — I10 PRIMARY HYPERTENSION: ICD-10-CM

## 2024-01-17 DIAGNOSIS — I10 PRIMARY HYPERTENSION: ICD-10-CM

## 2024-01-17 RX ORDER — LISINOPRIL 10 MG/1
10 TABLET ORAL DAILY
Qty: 30 TABLET | Refills: 0 | Status: SHIPPED | OUTPATIENT
Start: 2024-01-17

## 2024-01-17 RX ORDER — LISINOPRIL 10 MG/1
10 TABLET ORAL DAILY
Qty: 90 TABLET | OUTPATIENT
Start: 2024-01-17

## 2024-01-24 DIAGNOSIS — I10 PRIMARY HYPERTENSION: ICD-10-CM

## 2024-01-24 RX ORDER — LISINOPRIL 10 MG/1
10 TABLET ORAL DAILY
Qty: 30 TABLET | Refills: 0 | Status: SHIPPED | OUTPATIENT
Start: 2024-01-24

## (undated) DEVICE — GLV SURG SENSICARE W/ALOE PF LF 7.5 STRL

## (undated) DEVICE — SUT GUT CHRM 0 CTX 36IN 904H BX/36

## (undated) DEVICE — SUT PDS 0 CT1 36IN Z346H

## (undated) DEVICE — TRY SKINPREP DRYPREP

## (undated) DEVICE — PK C/SECT 40

## (undated) DEVICE — SUCTION CANISTER, 1000CC,SAFELINER: Brand: DEROYAL

## (undated) DEVICE — SUT GUT CHRM 2/0 CT1 36IN 923H

## (undated) DEVICE — HYDROGEL COATED LATEX URINE METER FOLEY TRAY,16 FR/CH (5.3 MM), 5 ML CATHETER PRE-CONNECTED TO 2000 ML DRAINAGE BAG WITH NEEDLE SAMPLING: Brand: DOVER

## (undated) DEVICE — ANTIBACTERIAL UNDYED BRAIDED (POLYGLACTIN 910), SYNTHETIC ABSORBABLE SUTURE: Brand: COATED VICRYL

## (undated) DEVICE — APPL CHLORAPREP W/TINT 26ML ORNG

## (undated) DEVICE — SOL IRR H2O BTL 1000ML STRL

## (undated) DEVICE — PREP SOL POVIDONE/IODINE BT 4OZ